# Patient Record
Sex: MALE | Race: WHITE | NOT HISPANIC OR LATINO | Employment: FULL TIME | URBAN - METROPOLITAN AREA
[De-identification: names, ages, dates, MRNs, and addresses within clinical notes are randomized per-mention and may not be internally consistent; named-entity substitution may affect disease eponyms.]

---

## 2017-05-04 ENCOUNTER — OFFICE VISIT (OUTPATIENT)
Dept: URGENT CARE | Facility: CLINIC | Age: 37
End: 2017-05-04
Payer: COMMERCIAL

## 2017-05-04 VITALS
HEART RATE: 70 BPM | TEMPERATURE: 97.9 F | DIASTOLIC BLOOD PRESSURE: 80 MMHG | SYSTOLIC BLOOD PRESSURE: 120 MMHG | RESPIRATION RATE: 16 BRPM | WEIGHT: 306 LBS | OXYGEN SATURATION: 96 % | BODY MASS INDEX: 37.26 KG/M2

## 2017-05-04 DIAGNOSIS — S39.012A STRAIN OF LUMBAR PARASPINAL MUSCLE, INITIAL ENCOUNTER: ICD-10-CM

## 2017-05-04 PROCEDURE — 99214 OFFICE O/P EST MOD 30 MIN: CPT | Performed by: PHYSICIAN ASSISTANT

## 2017-05-04 PROCEDURE — 99999 PR NO CHARGE: CPT | Performed by: PHYSICIAN ASSISTANT

## 2017-05-04 RX ORDER — CYCLOBENZAPRINE HCL 10 MG
10 TABLET ORAL 3 TIMES DAILY PRN
Qty: 30 TAB | Refills: 0 | Status: SHIPPED | OUTPATIENT
Start: 2017-05-04 | End: 2017-09-11

## 2017-05-04 RX ORDER — NAPROXEN 500 MG/1
500 TABLET ORAL 2 TIMES DAILY WITH MEALS
Qty: 60 TAB | Refills: 2 | Status: SHIPPED | OUTPATIENT
Start: 2017-05-04 | End: 2017-09-11

## 2017-05-04 RX ORDER — KETOROLAC TROMETHAMINE 30 MG/ML
60 INJECTION, SOLUTION INTRAMUSCULAR; INTRAVENOUS ONCE
Status: COMPLETED | OUTPATIENT
Start: 2017-05-04 | End: 2017-05-04

## 2017-05-04 RX ADMIN — KETOROLAC TROMETHAMINE 60 MG: 30 INJECTION, SOLUTION INTRAMUSCULAR; INTRAVENOUS at 16:36

## 2017-05-04 ASSESSMENT — ENCOUNTER SYMPTOMS
BACK PAIN: 1
TINGLING: 0
FEVER: 0
CHILLS: 0
SENSORY CHANGE: 0
VOMITING: 0
NAUSEA: 0
FOCAL WEAKNESS: 0
ABDOMINAL PAIN: 0

## 2017-05-04 NOTE — Clinical Note
May 4, 2017       Patient: Camacho Cardona   YOB: 1980   Date of Visit: 5/4/2017         To Whom It May Concern:    It is my medical opinion that Camacho Cardona should be excused from work for today and tomorrow due to illness.      If you have any questions or concerns, please don't hesitate to call [phone]          Sincerely,          Zach Turcios PA-C  Electronically Signed

## 2017-05-04 NOTE — PROGRESS NOTES
Subjective:      Camacho Cardona is a 36 y.o. male who presents with Back Pain            Back Pain  Pertinent negatives include no abdominal pain, fever or tingling.   notes long PMH of mult back inj, had seen spine MD around 10yrs ago and surg was recommended, ntoes was lying on floor playing w/ kids about one week ago, went to push himself up and felt pain limiting ROM, tried aleve over the week, tried ice/heat / stretching, pain on both sides R>L, radiation down bilat legs. Denies saddle anesthesia, incontinence of urine or bowel, retention of urine or bowel, also denies numbness/tingling or weakness to UE/LE.     Review of Systems   Constitutional: Negative for fever and chills.   Gastrointestinal: Negative for nausea, vomiting and abdominal pain.   Musculoskeletal: Positive for back pain.   Neurological: Negative for tingling, sensory change and focal weakness.     PMH:  has no past medical history on file.  MEDS:   Current outpatient prescriptions:   •  hydrocodone-acetaminophen (NORCO) 5-325 MG Tab per tablet, Take 1 Tab by mouth every four hours as needed., Disp: 18 Tab, Rfl: 0  •  ondansetron (ZOFRAN ODT) 4 MG TABLET DISPERSIBLE, Take 1 Tab by mouth every 8 hours as needed for Nausea/Vomiting., Disp: 10 Tab, Rfl: 0  •  clindamycin (CLEOCIN) 300 MG Cap, Take 300 mg by mouth 3 times a day., Disp: , Rfl:   •  diphenoxylate-atropine (LOMOTIL) 2.5-0.025 MG Tab, Take 2 Tabs by mouth 4 times a day as needed for Diarrhea., Disp: 24 Tab, Rfl: 1  •  sumatriptan (IMITREX) 100 MG tablet, Take 1 Tab by mouth Once PRN for Migraine for up to 1 dose. May repeat in 1 h as needed, Disp: 10 Tab, Rfl: 3  •  aspirin  MG TBEC, Take 1 Tab by mouth every day., Disp: 60 Tab, Rfl: 1  ALLERGIES:   Allergies   Allergen Reactions   • Morphine    • Pcn [Penicillins]    • Robaxin [Methocarbamol] Anaphylaxis     SURGHX: No past surgical history on file.  SOCHX:  reports that he has been smoking Cigars.  He does not have  any smokeless tobacco history on file. He reports that he drinks about 1.0 oz of alcohol per week. He reports that he does not use illicit drugs.  FH: Family history was reviewed, no pertinent findings to report    I have worn a mask for the entire encounter with this patient.        Objective:     /80 mmHg  Pulse 70  Temp(Src) 36.6 °C (97.9 °F)  Resp 16  Wt 138.801 kg (306 lb)  SpO2 96%     Physical Exam   Constitutional: He is oriented to person, place, and time. He appears well-developed and well-nourished. No distress.   HENT:   Head: Normocephalic and atraumatic.   Right Ear: External ear normal.   Left Ear: External ear normal.   Nose: Nose normal.   Eyes: Conjunctivae are normal. Right eye exhibits no discharge. Left eye exhibits no discharge. No scleral icterus.   Neck: Neck supple.   Pulmonary/Chest: Effort normal. No respiratory distress.   Musculoskeletal:        Lumbar back: He exhibits decreased range of motion ( 2/2 pain), tenderness ( primary paraspinous bilat), pain and spasm. He exhibits no bony tenderness, no swelling, no edema, no deformity, no laceration and normal pulse.        Back:    Neurological: He is alert and oriented to person, place, and time. He has normal strength and normal reflexes. He is not disoriented. No sensory deficit. He displays a negative Romberg sign. Coordination and gait normal.   SLR normal bilat   Skin: Skin is warm and dry. He is not diaphoretic. No pallor.   Psychiatric: He has a normal mood and affect.   Nursing note and vitals reviewed.     toradol - tolerates well    Assessment/Plan:     1. Strain of lumbar paraspinal muscle, initial encounter  Recommend conservative care, rest, ice/heat, work on gentle ROM exercises (sent w/ book)  Return to clinic with lack of resolution or progression of symptoms.  Cautioned regarding potential for sedation with medication.  F/u w/ spine, sent w/ work note    - cyclobenzaprine (FLEXERIL) 10 MG Tab; Take 1 Tab by  mouth 3 times a day as needed.  Dispense: 30 Tab; Refill: 0  - ketorolac (TORADOL) 60 mg injection; 2 mL by Intramuscular route Once.  - REFERRAL TO ORTHOPEDICS  - naproxen (NAPROSYN) 500 MG Tab; Take 1 Tab by mouth 2 times a day, with meals.  Dispense: 60 Tab; Refill: 2

## 2017-05-04 NOTE — MR AVS SNAPSHOT
Camacho Cardona   2017 3:45 PM   Office Visit   MRN: [id number]    Department:  [facility]   Dept Phone:  [phone]    Description:  Male : 1980   Provider:  Zach Turcios PA-C           Reason for Visit     Back Pain x 1 wk, bilateral lower back pain, pain is worse on the Rt. side, pain travel down on the back of both legs      Allergies as of 2017     Allergen Noted Reactions    Morphine 2012       Pcn [Penicillins] 2012       Robaxin [Methocarbamol] 2012   Anaphylaxis      You were diagnosed with     Strain of lumbar paraspinal muscle, initial encounter   [320751]         Vital Signs     Blood Pressure Pulse Temperature Respirations Weight Oxygen Saturation    120/80 mmHg 70 36.6 °C (97.9 °F) 16 138.801 kg (306 lb) 96%    Smoking Status                   Current Some Day Smoker           Basic Information     Date Of Birth Sex Race Ethnicity Preferred Language    1980 Male White Non- English      Problem List              ICD-10-CM Priority Class Noted - Resolved    Cephalalgia R51 Medium  2012 - Present    Cerebral infarction (CMS-[facility]) I63.9 High  2014 - Present    R Hemiparesthesia R20.2 High  7/10/2014 - Present    Dizziness R42 Medium  7/10/2014 - Present    History of concussion Z87.820 Medium  7/10/2014 - Present    Dehydration E86.0 Medium  7/10/2014 - Present    Epistaxis R04.0 Medium  7/10/2014 - Present      Health Maintenance        Date Due Completion Dates    IMM DTaP/Tdap/Td Vaccine (1 - Tdap) 2000 ---            Current Immunizations     No immunizations on file.      Below and/or attached are the medications your provider expects you to take. Review all of your home medications and newly ordered medications with your provider and/or pharmacist. Follow medication instructions as directed by your provider and/or pharmacist. Please keep your medication list with you and share with your provider. Update the  information when medications are discontinued, doses are changed, or new medications (including over-the-counter products) are added; and carry medication information at all times in the event of emergency situations     Allergies:  MORPHINE - (reactions not documented)     PCN - (reactions not documented)     ROBAXIN - Anaphylaxis               Medications  Valid as of: May 04, 2017 -  4:49 PM    Generic Name Brand Name Tablet Size Instructions for use    Aspirin (Tablet Delayed Response) Aspirin 325 MG Take 1 Tab by mouth every day.        Clindamycin HCl (Cap) CLEOCIN 300 MG Take 300 mg by mouth 3 times a day.        Cyclobenzaprine HCl (Tab) FLEXERIL 10 MG Take 1 Tab by mouth 3 times a day as needed.        Diphenoxylate-Atropine (Tab) LOMOTIL 2.5-0.025 MG Take 2 Tabs by mouth 4 times a day as needed for Diarrhea.        Hydrocodone-Acetaminophen (Tab) NORCO 5-325 MG Take 1 Tab by mouth every four hours as needed.        Naproxen (Tab) NAPROSYN 500 MG Take 1 Tab by mouth 2 times a day, with meals.        Ondansetron (TABLET DISPERSIBLE) ZOFRAN ODT 4 MG Take 1 Tab by mouth every 8 hours as needed for Nausea/Vomiting.        SUMAtriptan Succinate (Tab) IMITREX 100 MG Take 1 Tab by mouth Once PRN for Migraine for up to 1 dose. May repeat in 1 h as needed        .                 Medicines prescribed today were sent to:     [facility] [address] [place] [address] [place], [place] - [address] AT [facility] [employer] [address] [place] [address] [address] [place] [address]    Phone: [phone] Fax: [phone]    Open 24 Hours?: No      Medication refill instructions:       If your prescription bottle indicates you have medication refills left, it is not necessary to call your provider’s office. Please contact your pharmacy and they will refill your medication.    If your prescription bottle indicates you do not have any refills left, you may request refills at any time through one of the following ways: The online [employer] system (except  Urgent Care), by calling your provider’s office, or by asking your pharmacy to contact your provider’s office with a refill request. Medication refills are processed only during regular business hours and may not be available until the next business day. Your provider may request additional information or to have a follow-up visit with you prior to refilling your medication.   *Please Note: Medication refills are assigned a new Rx number when refilled electronically. Your pharmacy may indicate that no refills were authorized even though a new prescription for the same medication is available at the pharmacy. Please request the medicine by name with the pharmacy before contacting your provider for a refill.        Referral     A referral request has been sent to our patient care coordination department. Please allow 3-5 business days for us to process this request and contact you either by phone or mail. If you do not hear from us by the 5th business day, please call us at [phone].           [employer] Access Code: Activation code not generated  Current [employer] Status: Active          Quit Tobacco Information     Do you want to quit using tobacco?    Quitting tobacco decreases risks of cancer, heart and lung disease, increases life expectancy, improves sense of taste and smell, and increases spending money, among other benefits.    If you are thinking about quitting, we can help.  • [facility] Quit Tobacco Program: 932.630.9488  o Program occurs weekly for four weeks and includes pharmacist consultation on products to support quitting smoking or chewing tobacco. A provider referral is needed for pharmacist consultation.  • Tobacco Users Help Hotline: 6-315-QUIT-NOW (928-2098) or https://[place].quitlogix.org/  o Free, confidential telephone and online coaching for [place] residents. Sessions are designed on a schedule that is convenient for you. Eligible clients receive free nicotine replacement therapy.  • Nationally:  www.smokefree.gov  o Information and professional assistance to support both immediate and long-term needs as you become, and remain, a non-smoker. Smokefree.gov allows you to choose the help that best fits your needs.

## 2017-05-09 DIAGNOSIS — M62.830 LUMBAR PARASPINAL MUSCLE SPASM: ICD-10-CM

## 2017-05-09 RX ORDER — METHYLPREDNISOLONE 4 MG/1
TABLET ORAL
Qty: 1 KIT | Refills: 0 | Status: SHIPPED | OUTPATIENT
Start: 2017-05-09 | End: 2017-09-11

## 2017-06-02 ENCOUNTER — HOSPITAL ENCOUNTER (OUTPATIENT)
Dept: RADIOLOGY | Facility: MEDICAL CENTER | Age: 37
End: 2017-06-02
Attending: ORTHOPAEDIC SURGERY
Payer: COMMERCIAL

## 2017-06-02 DIAGNOSIS — M54.5 LOW BACK PAIN, UNSPECIFIED BACK PAIN LATERALITY, UNSPECIFIED CHRONICITY, WITH SCIATICA PRESENCE UNSPECIFIED: ICD-10-CM

## 2017-06-02 PROCEDURE — 72148 MRI LUMBAR SPINE W/O DYE: CPT

## 2017-06-20 ENCOUNTER — APPOINTMENT (OUTPATIENT)
Dept: PHYSICAL THERAPY | Facility: REHABILITATION | Age: 37
End: 2017-06-20
Attending: ORTHOPAEDIC SURGERY
Payer: COMMERCIAL

## 2017-06-22 ENCOUNTER — APPOINTMENT (OUTPATIENT)
Dept: PHYSICAL THERAPY | Facility: REHABILITATION | Age: 37
End: 2017-06-22
Attending: ORTHOPAEDIC SURGERY
Payer: COMMERCIAL

## 2017-06-27 ENCOUNTER — APPOINTMENT (OUTPATIENT)
Dept: PHYSICAL THERAPY | Facility: REHABILITATION | Age: 37
End: 2017-06-27
Attending: ORTHOPAEDIC SURGERY
Payer: COMMERCIAL

## 2017-06-29 ENCOUNTER — APPOINTMENT (OUTPATIENT)
Dept: PHYSICAL THERAPY | Facility: REHABILITATION | Age: 37
End: 2017-06-29
Attending: ORTHOPAEDIC SURGERY
Payer: COMMERCIAL

## 2017-07-05 ENCOUNTER — APPOINTMENT (OUTPATIENT)
Dept: PHYSICAL THERAPY | Facility: REHABILITATION | Age: 37
End: 2017-07-05
Attending: ORTHOPAEDIC SURGERY
Payer: COMMERCIAL

## 2017-07-07 ENCOUNTER — APPOINTMENT (OUTPATIENT)
Dept: PHYSICAL THERAPY | Facility: REHABILITATION | Age: 37
End: 2017-07-07
Attending: ORTHOPAEDIC SURGERY
Payer: COMMERCIAL

## 2017-07-11 ENCOUNTER — APPOINTMENT (OUTPATIENT)
Dept: PHYSICAL THERAPY | Facility: REHABILITATION | Age: 37
End: 2017-07-11
Attending: ORTHOPAEDIC SURGERY
Payer: COMMERCIAL

## 2017-07-13 ENCOUNTER — APPOINTMENT (OUTPATIENT)
Dept: PHYSICAL THERAPY | Facility: REHABILITATION | Age: 37
End: 2017-07-13
Attending: ORTHOPAEDIC SURGERY
Payer: COMMERCIAL

## 2017-09-11 ENCOUNTER — HOSPITAL ENCOUNTER (OUTPATIENT)
Dept: PAIN MANAGEMENT | Facility: REHABILITATION | Age: 37
End: 2017-09-11
Attending: PHYSICAL MEDICINE & REHABILITATION
Payer: COMMERCIAL

## 2017-09-11 ENCOUNTER — HOSPITAL ENCOUNTER (OUTPATIENT)
Dept: RADIOLOGY | Facility: REHABILITATION | Age: 37
End: 2017-09-11
Attending: PHYSICAL MEDICINE & REHABILITATION
Payer: COMMERCIAL

## 2017-09-11 VITALS
WEIGHT: 302.91 LBS | TEMPERATURE: 98.3 F | HEART RATE: 66 BPM | RESPIRATION RATE: 17 BRPM | BODY MASS INDEX: 36.89 KG/M2 | OXYGEN SATURATION: 94 % | SYSTOLIC BLOOD PRESSURE: 146 MMHG | HEIGHT: 76 IN | DIASTOLIC BLOOD PRESSURE: 93 MMHG

## 2017-09-11 PROCEDURE — 64484 NJX AA&/STRD TFRM EPI L/S EA: CPT

## 2017-09-11 PROCEDURE — 700101 HCHG RX REV CODE 250

## 2017-09-11 PROCEDURE — 64483 NJX AA&/STRD TFRM EPI L/S 1: CPT

## 2017-09-11 PROCEDURE — 700111 HCHG RX REV CODE 636 W/ 250 OVERRIDE (IP)

## 2017-09-11 PROCEDURE — 700117 HCHG RX CONTRAST REV CODE 255

## 2017-09-11 RX ORDER — LIDOCAINE HYDROCHLORIDE 20 MG/ML
INJECTION, SOLUTION EPIDURAL; INFILTRATION; INTRACAUDAL; PERINEURAL
Status: COMPLETED
Start: 2017-09-11 | End: 2017-09-11

## 2017-09-11 RX ORDER — METHYLPREDNISOLONE ACETATE 80 MG/ML
INJECTION, SUSPENSION INTRA-ARTICULAR; INTRALESIONAL; INTRAMUSCULAR; SOFT TISSUE
Status: COMPLETED
Start: 2017-09-11 | End: 2017-09-11

## 2017-09-11 RX ORDER — DEXAMETHASONE SODIUM PHOSPHATE 10 MG/ML
INJECTION, SOLUTION INTRAMUSCULAR; INTRAVENOUS
Status: COMPLETED
Start: 2017-09-11 | End: 2017-09-11

## 2017-09-11 RX ADMIN — LIDOCAINE HYDROCHLORIDE 4 ML: 20 INJECTION, SOLUTION EPIDURAL; INFILTRATION; INTRACAUDAL; PERINEURAL at 15:39

## 2017-09-11 RX ADMIN — METHYLPREDNISOLONE ACETATE 80 MG: 80 INJECTION, SUSPENSION INTRA-ARTICULAR; INTRALESIONAL; INTRAMUSCULAR; SOFT TISSUE at 15:39

## 2017-09-11 RX ADMIN — IOHEXOL 4 ML: 240 INJECTION, SOLUTION INTRATHECAL; INTRAVASCULAR; INTRAVENOUS; ORAL at 15:38

## 2017-09-11 ASSESSMENT — PAIN SCALES - GENERAL
PAINLEVEL_OUTOF10: 6
PAINLEVEL_OUTOF10: 5

## 2017-09-11 NOTE — PROGRESS NOTES
Current medications reviewed with pt, see medications reconciliation form. Pt wiliam taking ASA or other blood thinners or anti-inflammatories.  Pt has a ride post-procedure(Fiance to drive).  Printed and verbal discharge instructions given to pt who verbalized understanding.

## 2017-09-12 NOTE — PROCEDURES
DATE OF SERVICE:  09/11/2017    PREOPERATIVE DIAGNOSIS:  Lumbar degenerative disk disease and radiculopathy.    POSTOPERATIVE DIAGNOSIS:  Lumbar degenerative disk disease and radiculopathy.    OPERATION PERFORMED:  1.  Bilateral L5 selective nerve root blocks.  2.  Epidurogram.  3.  Interpretation of epidurogram.    SURGEON:  Dong Ray Jr., MD    ANESTHESIA:  None.    ESTIMATED BLOOD LOSS:  None.    IV FLUIDS:  None.    COMPLICATIONS:  None.    Dear Dr. Thakkar,    Thank you for the referral of this patient.    SUBJECTIVE:  The patient reports constant, achy low back pain and bilateral   lower extremity radicular pain that travels down his posterior thighs and legs   to his heels.    OBJECTIVE:  VITAL SIGNS:  Pulse is 66, respirations 16, and blood pressure 134/88.  GENERAL:  The patient is alert and oriented x3 in no acute distress.  CARDIOVASCULAR:  Shows no ankle edema bilaterally.  SKIN:  He has normal temperature and good capillary refill.  MUSCULOSKELETAL:  He has tenderness to palpation over his bilateral lumbar   paraspinal muscles.    INDICATIONS:  This is a 37-year-old male patient with chronic low back pain   who is here today for bilateral L5 selective nerve root blocks.  He was given   and explained risks, benefits and alternatives of the procedure, including   bleeding, infection, nerve damage, increased pain, paralysis or weakness,   which may be permanent, coma, stroke and even death.  The patient understood   all risks and consented to the procedure today.  He has been n.p.o. and has a   ride home.    PROCEDURE IN DETAIL:  After discussion of the risks and benefits, the patient   consented to the procedure and was taken to the fluoroscopy suite.  He was   placed in the prone position on the fluoroscopy table, and monitors were   applied.  The lumbosacral area was cleaned and prepped in the usual sterile   fashion with Betadine x3 and a sterile drape.  Using AP fluoroscopy, the L5   vertebral  "level was brought into view and bilateral, \"Vazquez dog\" views were   optimized.  The skin overlying the target at the neck, at the Vazquez dog was   anesthetized with 1% lidocaine using a 27-gauge needle.  Next, using two,   22-gauge, 5-inch spinal needles, they were directed to each of the appropriate   locations at the neck of the Vazquez isaacs without difficulty or problems.    Multiple fluoroscopic views were obtained to confirm needle placement.    Contrast dye was then instilled which showed an excellent neurogram of the   bilateral L5 nerve roots with both anterior and posterior spread into the   epidural space.  No filling defects were seen.  There was no evidence of   vascular pattern.  A mixture containing 80 mg Depo-Medrol and 4 mL of 2%   Lidocaine was then distributed equally between the 2 transforaminal locations   and the needles were flushed and withdrawn.  The patient's back was cleaned   and he was taken to the postop area where he was monitored for 30-45 minutes   without complications and with stable vital signs.  He was given appropriate   discharge instructions and was discharged home with a responsible adult.       ____________________________________     MD YAMILETH Palmer Jr. / LUCINDA    DD:  09/11/2017 17:35:15  DT:  09/11/2017 17:52:30    D#:  [id number]  Job#:  [id number]  "

## 2018-01-06 ENCOUNTER — HOSPITAL ENCOUNTER (OUTPATIENT)
Facility: MEDICAL CENTER | Age: 38
End: 2018-01-06
Attending: PHYSICIAN ASSISTANT
Payer: COMMERCIAL

## 2018-01-06 ENCOUNTER — OFFICE VISIT (OUTPATIENT)
Dept: URGENT CARE | Facility: PHYSICIAN GROUP | Age: 38
End: 2018-01-06
Payer: COMMERCIAL

## 2018-01-06 VITALS
DIASTOLIC BLOOD PRESSURE: 86 MMHG | TEMPERATURE: 99 F | OXYGEN SATURATION: 96 % | HEIGHT: 76 IN | WEIGHT: 303 LBS | HEART RATE: 87 BPM | BODY MASS INDEX: 36.9 KG/M2 | SYSTOLIC BLOOD PRESSURE: 122 MMHG

## 2018-01-06 DIAGNOSIS — R19.7 DIARRHEA OF PRESUMED INFECTIOUS ORIGIN: ICD-10-CM

## 2018-01-06 DIAGNOSIS — R10.13 EPIGASTRIC PAIN: ICD-10-CM

## 2018-01-06 DIAGNOSIS — R19.7 DIARRHEA OF PRESUMED INFECTIOUS ORIGIN: Primary | ICD-10-CM

## 2018-01-06 LAB
ALBUMIN SERPL BCP-MCNC: 4.4 G/DL (ref 3.2–4.9)
ALBUMIN/GLOB SERPL: 1.3 G/DL
ALP SERPL-CCNC: 79 U/L (ref 30–99)
ALT SERPL-CCNC: 24 U/L (ref 2–50)
ANION GAP SERPL CALC-SCNC: 9 MMOL/L (ref 0–11.9)
APPEARANCE UR: CLEAR
AST SERPL-CCNC: 20 U/L (ref 12–45)
BASOPHILS # BLD AUTO: 0.2 % (ref 0–1.8)
BASOPHILS # BLD: 0.01 K/UL (ref 0–0.12)
BILIRUB SERPL-MCNC: 1.1 MG/DL (ref 0.1–1.5)
BILIRUB UR STRIP-MCNC: NEGATIVE MG/DL
BUN SERPL-MCNC: 18 MG/DL (ref 8–22)
CALCIUM SERPL-MCNC: 9.3 MG/DL (ref 8.5–10.5)
CHLORIDE SERPL-SCNC: 109 MMOL/L (ref 96–112)
CO2 SERPL-SCNC: 21 MMOL/L (ref 20–33)
COLOR UR AUTO: NORMAL
CREAT SERPL-MCNC: 0.92 MG/DL (ref 0.5–1.4)
EOSINOPHIL # BLD AUTO: 0.08 K/UL (ref 0–0.51)
EOSINOPHIL NFR BLD: 1.2 % (ref 0–6.9)
ERYTHROCYTE [DISTWIDTH] IN BLOOD BY AUTOMATED COUNT: 40.2 FL (ref 35.9–50)
EST. AVERAGE GLUCOSE BLD GHB EST-MCNC: 103 MG/DL
G LAMBLIA+C PARVUM AG STL QL RAPID: NORMAL
GFR SERPL CREATININE-BSD FRML MDRD: >60 ML/MIN/1.73 M 2
GLOBULIN SER CALC-MCNC: 3.3 G/DL (ref 1.9–3.5)
GLUCOSE SERPL-MCNC: 95 MG/DL (ref 65–99)
GLUCOSE UR STRIP.AUTO-MCNC: NEGATIVE MG/DL
HBA1C MFR BLD: 5.2 % (ref 0–5.6)
HCT VFR BLD AUTO: 45.5 % (ref 42–52)
HGB BLD-MCNC: 16 G/DL (ref 14–18)
IMM GRANULOCYTES # BLD AUTO: 0.02 K/UL (ref 0–0.11)
IMM GRANULOCYTES NFR BLD AUTO: 0.3 % (ref 0–0.9)
KETONES UR STRIP.AUTO-MCNC: NEGATIVE MG/DL
LEUKOCYTE ESTERASE UR QL STRIP.AUTO: NEGATIVE
LIPASE SERPL-CCNC: 23 U/L (ref 11–82)
LYMPHOCYTES # BLD AUTO: 1.7 K/UL (ref 1–4.8)
LYMPHOCYTES NFR BLD: 25.6 % (ref 22–41)
MCH RBC QN AUTO: 31.4 PG (ref 27–33)
MCHC RBC AUTO-ENTMCNC: 35.2 G/DL (ref 33.7–35.3)
MCV RBC AUTO: 89.4 FL (ref 81.4–97.8)
MONOCYTES # BLD AUTO: 0.7 K/UL (ref 0–0.85)
MONOCYTES NFR BLD AUTO: 10.5 % (ref 0–13.4)
NEUTROPHILS # BLD AUTO: 4.13 K/UL (ref 1.82–7.42)
NEUTROPHILS NFR BLD: 62.2 % (ref 44–72)
NITRITE UR QL STRIP.AUTO: NEGATIVE
NRBC # BLD AUTO: 0 K/UL
NRBC BLD-RTO: 0 /100 WBC
PH UR STRIP.AUTO: 6 [PH] (ref 5–8)
PLATELET # BLD AUTO: 167 K/UL (ref 164–446)
PMV BLD AUTO: 10 FL (ref 9–12.9)
POTASSIUM SERPL-SCNC: 4 MMOL/L (ref 3.6–5.5)
PROT SERPL-MCNC: 7.7 G/DL (ref 6–8.2)
PROT UR QL STRIP: NORMAL MG/DL
RBC # BLD AUTO: 5.09 M/UL (ref 4.7–6.1)
RBC UR QL AUTO: NEGATIVE
SIGNIFICANT IND 70042: NORMAL
SITE SITE: NORMAL
SODIUM SERPL-SCNC: 139 MMOL/L (ref 135–145)
SOURCE SOURCE: NORMAL
SP GR UR STRIP.AUTO: 1.02
UROBILINOGEN UR STRIP-MCNC: NEGATIVE MG/DL
WBC # BLD AUTO: 6.6 K/UL (ref 4.8–10.8)

## 2018-01-06 PROCEDURE — 83690 ASSAY OF LIPASE: CPT

## 2018-01-06 PROCEDURE — 83036 HEMOGLOBIN GLYCOSYLATED A1C: CPT

## 2018-01-06 PROCEDURE — 87328 CRYPTOSPORIDIUM AG IA: CPT

## 2018-01-06 PROCEDURE — 81002 URINALYSIS NONAUTO W/O SCOPE: CPT | Performed by: PHYSICIAN ASSISTANT

## 2018-01-06 PROCEDURE — 87329 GIARDIA AG IA: CPT

## 2018-01-06 PROCEDURE — 85025 COMPLETE CBC W/AUTO DIFF WBC: CPT

## 2018-01-06 PROCEDURE — 80053 COMPREHEN METABOLIC PANEL: CPT

## 2018-01-06 PROCEDURE — 99214 OFFICE O/P EST MOD 30 MIN: CPT | Performed by: PHYSICIAN ASSISTANT

## 2018-01-06 RX ORDER — DICYCLOMINE HYDROCHLORIDE 10 MG/1
10 CAPSULE ORAL
Qty: 40 CAP | Refills: 0 | Status: SHIPPED | OUTPATIENT
Start: 2018-01-06 | End: 2018-01-16

## 2018-01-06 RX ORDER — METRONIDAZOLE 500 MG/1
500 TABLET ORAL 3 TIMES DAILY
Qty: 21 TAB | Refills: 0 | Status: SHIPPED | OUTPATIENT
Start: 2018-01-06 | End: 2018-01-13

## 2018-01-06 ASSESSMENT — ENCOUNTER SYMPTOMS
PSYCHIATRIC NEGATIVE: 1
MUSCULOSKELETAL NEGATIVE: 1
DIARRHEA: 1
NAUSEA: 1
ABDOMINAL PAIN: 1
CARDIOVASCULAR NEGATIVE: 1
DIZZINESS: 1
HEADACHES: 1
RESPIRATORY NEGATIVE: 1
EYES NEGATIVE: 1
BLOOD IN STOOL: 0

## 2018-01-06 NOTE — PATIENT INSTRUCTIONS
Try a bowel reset diet:  Clear liquids for 24 hours  Full liquids for next 24 hrs  BRAT diet after that for 2-3 days  B Banana  R Rice  A Applesauce  T Toast

## 2018-01-06 NOTE — PROGRESS NOTES
Subjective:      Camacho Cardona is a 37 y.o. male who presents with Diarrhea (x1 wk )            HPI  Chief Complaint   Patient presents with   • Diarrhea     x1 wk        HPI:  Camacho Cardona is a 37 y.o. male who presents with diarrhea x 1 week.  Having diarrhea every 15 minutes worsening last night. Past week almost 2 weeks trying imodium and pepto.  Had some epigastric pain. Imodium helped for a while.  Whole family sick as well.   No antibiotics recently.  Did try aleve and made it worse.  Stool color is dark brown to lighter in color.  Super watery.  Different smell.    Not making as much urine.    Dizzy with standing. Decreased energy.    Worse with laying on his left side.    No recent camping trips.    No recent travel.    No past medical history on file.    No past surgical history on file.    Family History   Problem Relation Age of Onset   • Hypertension Mother    • Lung Disease Mother    • Diabetes Mother    • Cancer Mother      No pertinent family history.    Social History     Social History   • Marital status: Single     Spouse name: N/A   • Number of children: N/A   • Years of education: N/A     Occupational History   • Not on file.     Social History Main Topics   • Smoking status: Current Some Day Smoker     Types: Cigars   • Smokeless tobacco: Not on file   • Alcohol use 1.0 oz/week     2 Cans of beer per week      Comment: occasional, use tonight   • Drug use: No   • Sexual activity: Yes     Partners: Female     Other Topics Concern   • Not on file     Social History Narrative    ** Merged History Encounter **            No current outpatient prescriptions on file.    Allergies   Allergen Reactions   • Morphine    • Pcn [Penicillins]    • Robaxin [Methocarbamol] Anaphylaxis        Review of Systems   Constitutional: Positive for malaise/fatigue.   HENT: Negative.    Eyes: Negative.    Respiratory: Negative.    Cardiovascular: Negative.    Gastrointestinal: Positive for  "abdominal pain, diarrhea, melena and nausea. Negative for blood in stool.   Genitourinary: Negative.    Musculoskeletal: Negative.    Skin: Negative.    Neurological: Positive for dizziness and headaches.   Endo/Heme/Allergies: Negative.    Psychiatric/Behavioral: Negative.           Objective:     /86   Pulse 87   Temp 37.2 °C (99 °F)   Ht 1.93 m (6' 4\")   Wt (!) 137.4 kg (303 lb)   SpO2 96%   BMI 36.88 kg/m²      Physical Exam       Nursing note and vitals reviewed.    Constitutional:  Appropriately groomed, pleasant affect, and in no acute distress.    Head: normocephalic and atraumatic.    Eye:   PERRLA, EOM's full, sclera white, no scleral icterus, conjunctiva not erythematous, and medial canthus without exudate bilaterally.    Ears:  Hearing grossly intact to voice.    Throat:  Oropharynx not erythematous, with no enlargement of the palatine tonsils bilaterally with no exudates.    Posterior oropharynx with no post nasal drainage present.  Soft palate rises symmetrically bilaterally and uvula midline.  Neck supple, with mild proximal anterior cervical chain lymphadenopathy that is soft and mobile to palpation.  Thyroid non-palpable.  No supraclavicular lymphadenopathy.    Lungs:  Lungs with normal respiratory excursion and effort.    Heart:  RRR, without murmurs, rubs, or gallops.  Carotid arteries without bruits bilaterally.  Radial and dorsalis pedis pulses 2+ bilaterally    Abdomen:  Protuberant.with striations Present. No ecchymosis, or lesions.  Bowel sounds present all 4Qs, hyperactive.  Normotympanic to percussion all 4Qs.  Epigastric  TTP.  No masses to palpation.  No hepatosplenomegaly, no TTP at McBurney's point, negative Johnson's sign, no rebound tenderness, and no guarding.  No CVA tenderness bilaterally.    MSK:  Gait and station wnl, non antalgic.    Derm:  No rashes or lesions with good turgor pressure.     Psychiatric:  Normal judgement, mood and affect.      Component Value Ref " Range & Units Status   Sodium 139 135 - 145 mmol/L Final   Potassium 4.0 3.6 - 5.5 mmol/L Final   Chloride 109 96 - 112 mmol/L Final   Co2 21 20 - 33 mmol/L Final   Anion Gap 9.0 0.0 - 11.9 Final   Glucose 95 65 - 99 mg/dL Final   Bun 18 8 - 22 mg/dL Final   Creatinine 0.92 0.50 - 1.40 mg/dL Final   Calcium 9.3 8.5 - 10.5 mg/dL Final   AST(SGOT) 20 12 - 45 U/L Final   ALT(SGPT) 24 2 - 50 U/L Final   Alkaline Phosphatase 79 30 - 99 U/L Final   Total Bilirubin 1.1 0.1 - 1.5 mg/dL Final   Albumin 4.4 3.2 - 4.9 g/dL Final   Total Protein 7.7 6.0 - 8.2 g/dL Final   Globulin 3.3 1.9 - 3.5 g/dL Final   A-G Ratio 1.3 g/dL Final     Component Value Ref Range & Units Status   GFR If African American >60 >60 mL/min/1.73 m 2 Final   GFR If Non African American >60 >60 mL/min/1.73 m 2 Final     Component Value Ref Range & Units Status   WBC 6.6 4.8 - 10.8 K/uL Final   RBC 5.09 4.70 - 6.10 M/uL Final   Hemoglobin 16.0 14.0 - 18.0 g/dL Final   Hematocrit 45.5 42.0 - 52.0 % Final   MCV 89.4 81.4 - 97.8 fL Final   MCH 31.4 27.0 - 33.0 pg Final   MCHC 35.2 33.7 - 35.3 g/dL Final   RDW 40.2 35.9 - 50.0 fL Final   Platelet Count 167 164 - 446 K/uL Final   MPV 10.0 9.0 - 12.9 fL Final   Neutrophils-Polys 62.20 44.00 - 72.00 % Final   Lymphocytes 25.60 22.00 - 41.00 % Final   Monocytes 10.50 0.00 - 13.40 % Final   Eosinophils 1.20 0.00 - 6.90 % Final   Basophils 0.20 0.00 - 1.80 % Final   Immature Granulocytes 0.30 0.00 - 0.90 % Final   Nucleated RBC 0.00 /100 WBC Final   Neutrophils (Absolute) 4.13 1.82 - 7.42 K/uL Final   Comment:   Includes immature neutrophils, if present.   Lymphs (Absolute) 1.70 1.00 - 4.80 K/uL Final   Monos (Absolute) 0.70 0.00 - 0.85 K/uL Final   Eos (Absolute) 0.08 0.00 - 0.51 K/uL Final   Baso (Absolute) 0.01 0.00 - 0.12 K/uL Final   Immature Granulocytes (abs) 0.02 0.00 - 0.11 K/uL Final   NRBC (Absolute) 0.00 K/uL Final     Component Value Ref Range & Units Status   Lipase 23 11 - 82 U/L Final      Glycohemoglobin 5.2 0.0 - 5.6 % Final       Assessment/Plan:     1. Diarrhea of presumed infectious origin  CDIFF BY PCR    STOOL CULT+O+P+WBC    POCT Urinalysis    dicyclomine (BENTYL) 10 MG Cap    POCT Hemoglobin A1C    CBC WITH DIFFERENTIAL    COMP METABOLIC PANEL    LIPASE    metronidazole (FLAGYL) 500 MG Tab    REFERRAL TO GASTROENTEROLOGY    CANCELED: CBC WITH DIFFERENTIAL    CANCELED: COMP METABOLIC PANEL    CANCELED: LIPASE   2. Epigastric pain  POCT Urinalysis    dicyclomine (BENTYL) 10 MG Cap    POCT Hemoglobin A1C    CBC WITH DIFFERENTIAL    COMP METABOLIC PANEL    LIPASE    metronidazole (FLAGYL) 500 MG Tab    REFERRAL TO GASTROENTEROLOGY    CANCELED: LIPASE      Patient presents with 2 weeks of diarrhea 10+ times daily. Having generalized abdominal cramping. Has tried Imodium which helps for a short time and Pepto. Has had darker stool but no bright red blood per rectum or melena. Patient with no recent antibiotic use or recent hospitalization. No known history of C. difficile. Has had diarrhea in the past and was worked up and never got stool sample completed. On exam today patient is afebrile and comfortable appearing. Presents with wife. Low-grade temperature. Generalized abdominal tenderness with no guarding. Bowel sounds hyperactive. Plan to rule out C. difficile and other infectious disease. Cannot rule out colitis. Plan treatment with Bentyl and metronidazole. Obtain CBC, CMP and lipase to rule out epigastric causes. Referred to GI.Labwork available at closing negative for elevated pancreatic enzymes, liver enzymes, leukocytosis. Crypto spurred him and Giardia negative. PCR C. difficile still not back at this time.    Contacted and left message (patient self identified) at 6 PM.    Patient was in agreement with this treatment plan and seemed to understand without barriers. All questions were encouraged and answered.  Reviewed signs and symptoms of when to seek emergency medical care.      Please note that this dictation was created using voice recognition software.  I have made every reasonable attempt to correct obvious errors, but I expect there are errors of sheng and possibly content that I did not discover before finalizing the note.

## 2018-01-11 ENCOUNTER — TELEPHONE (OUTPATIENT)
Dept: URGENT CARE | Facility: PHYSICIAN GROUP | Age: 38
End: 2018-01-11

## 2018-01-12 NOTE — TELEPHONE ENCOUNTER
PT notified of missing labs.  PT states he's feeling a lot better and understands to continue the medication plan.

## 2018-01-12 NOTE — TELEPHONE ENCOUNTER
----- Message from Ethan Galo P.A.-C. sent at 1/11/2018  5:23 PM PST -----  Regarding: RE: Labs  Please contact patient and see if he is improving.  If not please get labs done.  If improving then no follow-up necessary.    Thank you Ethan Yo   ----- Message -----  From: Srinath Rogers, Med Ass't  Sent: 1/11/2018   3:10 PM  To: Ethan Galo P.A.-C.  Subject: Labs                                             Good afternoon Sunny,    I'm going over our lab reports and his CDIFF and STOOL CULT were missing.  I called the lab, and they stated that they missed those labs, and the patient will need to provide a new sample.  Please advise.

## 2018-09-22 ENCOUNTER — HOSPITAL ENCOUNTER (EMERGENCY)
Facility: MEDICAL CENTER | Age: 38
End: 2018-09-22
Attending: EMERGENCY MEDICINE
Payer: COMMERCIAL

## 2018-09-22 ENCOUNTER — APPOINTMENT (OUTPATIENT)
Dept: RADIOLOGY | Facility: MEDICAL CENTER | Age: 38
End: 2018-09-22
Attending: EMERGENCY MEDICINE
Payer: COMMERCIAL

## 2018-09-22 VITALS
HEART RATE: 72 BPM | TEMPERATURE: 100.2 F | WEIGHT: 300 LBS | SYSTOLIC BLOOD PRESSURE: 122 MMHG | HEIGHT: 76 IN | RESPIRATION RATE: 16 BRPM | DIASTOLIC BLOOD PRESSURE: 67 MMHG | OXYGEN SATURATION: 92 % | BODY MASS INDEX: 36.53 KG/M2

## 2018-09-22 DIAGNOSIS — S01.81XA FACIAL LACERATION, INITIAL ENCOUNTER: ICD-10-CM

## 2018-09-22 DIAGNOSIS — S06.0X1A CONCUSSION WITH LOSS OF CONSCIOUSNESS OF 30 MINUTES OR LESS, INITIAL ENCOUNTER: ICD-10-CM
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
## 2018-09-22 DIAGNOSIS — T14.8XXA ABRASION: ICD-10-CM

## 2018-09-22 DIAGNOSIS — M54.2 ACUTE NECK PAIN: ICD-10-CM

## 2018-09-22 DIAGNOSIS — F10.920 ACUTE ALCOHOLIC INTOXICATION WITHOUT COMPLICATION (HCC): ICD-10-CM

## 2018-09-22 LAB
ANION GAP SERPL CALC-SCNC: 11 MMOL/L (ref 0–11.9)
APTT PPP: 25.6 SEC (ref 24.7–36)
BASOPHILS # BLD AUTO: 0.4 % (ref 0–1.8)
BASOPHILS # BLD: 0.02 K/UL (ref 0–0.12)
BUN SERPL-MCNC: 12 MG/DL (ref 8–22)
CALCIUM SERPL-MCNC: 9 MG/DL (ref 8.5–10.5)
CHLORIDE SERPL-SCNC: 111 MMOL/L (ref 96–112)
CO2 SERPL-SCNC: 21 MMOL/L (ref 20–33)
CREAT SERPL-MCNC: 0.93 MG/DL (ref 0.5–1.4)
EOSINOPHIL # BLD AUTO: 0.07 K/UL (ref 0–0.51)
EOSINOPHIL NFR BLD: 1.4 % (ref 0–6.9)
ERYTHROCYTE [DISTWIDTH] IN BLOOD BY AUTOMATED COUNT: 40.5 FL (ref 35.9–50)
ETHANOL BLD-MCNC: 0.12 G/DL
GLUCOSE SERPL-MCNC: 102 MG/DL (ref 65–99)
HCT VFR BLD AUTO: 41.6 % (ref 42–52)
HGB BLD-MCNC: 14.8 G/DL (ref 14–18)
IMM GRANULOCYTES # BLD AUTO: 0.02 K/UL (ref 0–0.11)
IMM GRANULOCYTES NFR BLD AUTO: 0.4 % (ref 0–0.9)
INR PPP: 1.12 (ref 0.87–1.13)
LYMPHOCYTES # BLD AUTO: 1.64 K/UL (ref 1–4.8)
LYMPHOCYTES NFR BLD: 33.7 % (ref 22–41)
MCH RBC QN AUTO: 31.8 PG (ref 27–33)
MCHC RBC AUTO-ENTMCNC: 35.6 G/DL (ref 33.7–35.3)
MCV RBC AUTO: 89.3 FL (ref 81.4–97.8)
MONOCYTES # BLD AUTO: 0.32 K/UL (ref 0–0.85)
MONOCYTES NFR BLD AUTO: 6.6 % (ref 0–13.4)
NEUTROPHILS # BLD AUTO: 2.8 K/UL (ref 1.82–7.42)
NEUTROPHILS NFR BLD: 57.5 % (ref 44–72)
NRBC # BLD AUTO: 0 K/UL
NRBC BLD-RTO: 0 /100 WBC
PLATELET # BLD AUTO: 161 K/UL (ref 164–446)
PMV BLD AUTO: 9.8 FL (ref 9–12.9)
POTASSIUM SERPL-SCNC: 3.7 MMOL/L (ref 3.6–5.5)
PROTHROMBIN TIME: 14.5 SEC (ref 12–14.6)
RBC # BLD AUTO: 4.66 M/UL (ref 4.7–6.1)
SODIUM SERPL-SCNC: 143 MMOL/L (ref 135–145)
WBC # BLD AUTO: 4.9 K/UL (ref 4.8–10.8)

## 2018-09-22 PROCEDURE — 700102 HCHG RX REV CODE 250 W/ 637 OVERRIDE(OP): Performed by: EMERGENCY MEDICINE

## 2018-09-22 PROCEDURE — 80307 DRUG TEST PRSMV CHEM ANLYZR: CPT

## 2018-09-22 PROCEDURE — 99284 EMERGENCY DEPT VISIT MOD MDM: CPT

## 2018-09-22 PROCEDURE — A9270 NON-COVERED ITEM OR SERVICE: HCPCS | Performed by: EMERGENCY MEDICINE

## 2018-09-22 PROCEDURE — 36415 COLL VENOUS BLD VENIPUNCTURE: CPT

## 2018-09-22 PROCEDURE — 70450 CT HEAD/BRAIN W/O DYE: CPT

## 2018-09-22 PROCEDURE — 85610 PROTHROMBIN TIME: CPT

## 2018-09-22 PROCEDURE — 72125 CT NECK SPINE W/O DYE: CPT

## 2018-09-22 PROCEDURE — 85730 THROMBOPLASTIN TIME PARTIAL: CPT

## 2018-09-22 PROCEDURE — 80048 BASIC METABOLIC PNL TOTAL CA: CPT

## 2018-09-22 PROCEDURE — 303353 HCHG DERMABOND SKIN ADHESIVE

## 2018-09-22 PROCEDURE — 304999 HCHG REPAIR-SIMPLE/INTERMED LEVEL 1

## 2018-09-22 PROCEDURE — 85025 COMPLETE CBC W/AUTO DIFF WBC: CPT

## 2018-09-22 RX ORDER — IBUPROFEN 600 MG/1
600 TABLET ORAL ONCE
Status: COMPLETED | OUTPATIENT
Start: 2018-09-22 | End: 2018-09-22

## 2018-09-22 RX ORDER — ACETAMINOPHEN 500 MG
1000 TABLET ORAL ONCE
Status: COMPLETED | OUTPATIENT
Start: 2018-09-22 | End: 2018-09-22

## 2018-09-22 RX ADMIN — IBUPROFEN 600 MG: 600 TABLET, FILM COATED ORAL at 04:51

## 2018-09-22 RX ADMIN — ACETAMINOPHEN 1000 MG: 500 TABLET, FILM COATED ORAL at 04:51

## 2018-09-22 ASSESSMENT — PAIN SCALES - GENERAL: PAINLEVEL_OUTOF10: 8
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
## 2018-09-22 NOTE — ED NOTES
All lines and monitors discontinued. Discharge instructions given, questions answered.    Ambulated to wife's room. Pt instructed to notify staff if he starts having any concerns or issues.  Rx x 0 given.

## 2018-09-25 ENCOUNTER — HOSPITAL ENCOUNTER (OUTPATIENT)
Facility: MEDICAL CENTER | Age: 38
End: 2018-09-25
Payer: COMMERCIAL

## 2018-09-25 LAB
BDY FAT % MEASURED: 35.4 %
BP DIAS: 80 MMHG
BP SYS: 138 MMHG
DIABETES HTDIA: NO
EVENT NAME HTEVT: NORMAL
HYPERTENSION HTHYP: NO
SCREENING LOC CITY HTCIT: NORMAL
SCREENING LOC STATE HTSTA: NORMAL
SCREENING LOCATION HTLOC: NORMAL
SUBSCRIBER ID HTSID: NORMAL

## 2018-09-26 LAB
CHOLEST SERPL-MCNC: 164 MG/DL (ref 100–199)
GLUCOSE SERPL-MCNC: 98 MG/DL (ref 65–99)
HDLC SERPL-MCNC: 35 MG/DL
LDLC SERPL CALC-MCNC: 90 MG/DL
TRIGL SERPL-MCNC: 194 MG/DL (ref 0–149)

## 2019-07-18 ENCOUNTER — OFFICE VISIT (OUTPATIENT)
Dept: URGENT CARE | Facility: PHYSICIAN GROUP | Age: 39
End: 2019-07-18
Payer: COMMERCIAL

## 2019-07-18 VITALS
TEMPERATURE: 98.6 F | WEIGHT: 310 LBS | SYSTOLIC BLOOD PRESSURE: 130 MMHG | RESPIRATION RATE: 16 BRPM | BODY MASS INDEX: 37.75 KG/M2 | HEART RATE: 74 BPM | HEIGHT: 76 IN | OXYGEN SATURATION: 96 % | DIASTOLIC BLOOD PRESSURE: 76 MMHG

## 2019-07-18 DIAGNOSIS — T63.441A BEE STING, ACCIDENTAL OR UNINTENTIONAL, INITIAL ENCOUNTER: ICD-10-CM

## 2019-07-18 PROCEDURE — 99214 OFFICE O/P EST MOD 30 MIN: CPT | Performed by: PHYSICIAN ASSISTANT

## 2019-07-18 RX ORDER — TRIAMCINOLONE ACETONIDE 1 MG/G
CREAM TOPICAL
Qty: 1 TUBE | Refills: 0 | Status: SHIPPED | OUTPATIENT
Start: 2019-07-18 | End: 2021-02-12

## 2019-07-18 RX ORDER — METHYLPREDNISOLONE 4 MG/1
TABLET ORAL
Qty: 1 KIT | Refills: 0 | Status: SHIPPED | OUTPATIENT
Start: 2019-07-18 | End: 2021-02-12

## 2019-07-18 ASSESSMENT — ENCOUNTER SYMPTOMS
FEVER: 0
DIARRHEA: 0
CHILLS: 0
VOMITING: 0
WHEEZING: 0
SHORTNESS OF BREATH: 0
SPUTUM PRODUCTION: 0
SORE THROAT: 0
STRIDOR: 0
ABDOMINAL PAIN: 0
COUGH: 0
MYALGIAS: 1
NAUSEA: 0

## 2019-07-18 NOTE — PROGRESS NOTES
"Subjective:   Camacho Cardona is a 39 y.o. male who presents for Bee Sting (on ear,and on arm )         Other    This is a new problem.  The current episode started in the past 7 days. Associated symptoms include myalgias. Pertinent negatives include no abdominal pain, chills, congestion, coughing, fever, nausea, rash, sore throat or vomiting.     Patient comes to clinic 2 to 3 days status post bee stings noted to the left arm and 1 to left ear.  He notes mild progression of redness and swelling associated with areas of staying.  Denies any compromising airway.  Denies swelling to throat, denies stridor, denies wheeze, denies new onset cough.  Complains of some pain to the sites of bee sting.  Denies past medical history of bee sting allergies.  Denies purulent discharge or drainage.  Notes some mild localized swelling.    Review of Systems   Constitutional: Negative for chills and fever.   HENT: Negative for congestion and sore throat.    Respiratory: Negative for cough, sputum production, shortness of breath, wheezing and stridor.    Gastrointestinal: Negative for abdominal pain, diarrhea, nausea and vomiting.   Musculoskeletal: Positive for myalgias.   Skin: Negative for rash.        Positive for bee stings     Allergies   Allergen Reactions   • Morphine    • Pcn [Penicillins]    • Robaxin [Methocarbamol] Anaphylaxis      Objective:   /76   Pulse 74   Temp 37 °C (98.6 °F)   Resp 16   Ht 1.93 m (6' 4\")   Wt (!) 140.6 kg (310 lb)   SpO2 96%   BMI 37.73 kg/m²    Physical Exam   Constitutional: He is oriented to person, place, and time. He appears well-developed and well-nourished. No distress.   HENT:   Head: Normocephalic and atraumatic.   Right Ear: External ear normal.   Left Ear: External ear normal.   Nose: Nose normal.   Mouth/Throat: Uvula is midline, oropharynx is clear and moist and mucous membranes are normal. No uvula swelling. No posterior oropharyngeal edema or posterior " oropharyngeal erythema.   Eyes: Conjunctivae are normal. Right eye exhibits no discharge. Left eye exhibits no discharge. No scleral icterus.   Neck: Neck supple.   Pulmonary/Chest: Effort normal and breath sounds normal. No respiratory distress. He has no decreased breath sounds. He has no wheezes. He has no rhonchi. He has no rales.   Musculoskeletal: Normal range of motion.   Neurological: He is alert and oriented to person, place, and time. Coordination normal.   Skin: Skin is warm and dry. He is not diaphoretic. No pallor.        Patient with 3 distinct erythematous wheals to left forearm area, nonfluctuant, no evident discharge or drainage, localized well-demarcated erythema but no concerning extension of erythema, no ecchymosis, no significant breakage of skin, no visible stinger, left ear with evident insect bite to the top of auricle, no involvement of canal, mild diffuse edema, brisk capillary refill throughout   Psychiatric: He has a normal mood and affect.   Nursing note and vitals reviewed.        Assessment/Plan:   1. Bee sting, accidental or unintentional, initial encounter  - methylPREDNISolone (MEDROL DOSEPAK) 4 MG Tablet Therapy Pack; Take as directed on package.  Dispense one package.  Dispense: 1 Kit; Refill: 0  - triamcinolone acetonide (KENALOG) 0.1 % Cream; Apply to affected area BID  Dispense: 1 Tube; Refill: 0  Supportive care is reviewed with patient/caregiver - recommend to push PO fluids and electrolytes, no concern for cellulitic progression based on today's exam, encourage patient to watch for fevers chills and progression of redness from breakage of skin particularly with purulence, contact clinic with any of the symptoms and I will send antibiotic to pharmacy,Cautioned regarding potential side effects of steroid, avoid nsaids while using  Differential diagnosis, natural history, supportive care, and indications for immediate follow-up discussed.

## 2019-07-18 NOTE — LETTER
July 18, 2019       Patient: Camacho Cardona   YOB: 1980   Date of Visit: 7/18/2019         To Whom It May Concern:    It is my medical opinion that Camacho Cardona should be excused from work for today and tomorrow due to illness.    If you have any questions or concerns, please don't hesitate to call [phone]          Sincerely,          Zach Turcios P.A.-C.  Electronically Signed

## 2019-11-21 ENCOUNTER — HOSPITAL ENCOUNTER (EMERGENCY)
Facility: MEDICAL CENTER | Age: 39
End: 2019-11-22
Attending: EMERGENCY MEDICINE
Payer: COMMERCIAL

## 2019-11-21 DIAGNOSIS — R19.7 DIARRHEA, UNSPECIFIED TYPE: ICD-10-CM

## 2019-11-21 DIAGNOSIS — B34.9 ACUTE VIRAL SYNDROME: ICD-10-CM

## 2019-11-21 LAB
ALBUMIN SERPL BCP-MCNC: 4.3 G/DL (ref 3.2–4.9)
ALBUMIN/GLOB SERPL: 1.3 G/DL
ALP SERPL-CCNC: 87 U/L (ref 30–99)
ALT SERPL-CCNC: 29 U/L (ref 2–50)
ANION GAP SERPL CALC-SCNC: 10 MMOL/L (ref 0–11.9)
APPEARANCE UR: CLEAR
AST SERPL-CCNC: 25 U/L (ref 12–45)
BASOPHILS # BLD AUTO: 0.4 % (ref 0–1.8)
BASOPHILS # BLD: 0.02 K/UL (ref 0–0.12)
BILIRUB SERPL-MCNC: 1 MG/DL (ref 0.1–1.5)
BILIRUB UR QL STRIP.AUTO: ABNORMAL
BUN SERPL-MCNC: 14 MG/DL (ref 8–22)
CALCIUM SERPL-MCNC: 8.8 MG/DL (ref 8.5–10.5)
CHLORIDE SERPL-SCNC: 110 MMOL/L (ref 96–112)
CO2 SERPL-SCNC: 20 MMOL/L (ref 20–33)
COLOR UR: ABNORMAL
CREAT SERPL-MCNC: 0.94 MG/DL (ref 0.5–1.4)
EOSINOPHIL # BLD AUTO: 0.05 K/UL (ref 0–0.51)
EOSINOPHIL NFR BLD: 0.9 % (ref 0–6.9)
ERYTHROCYTE [DISTWIDTH] IN BLOOD BY AUTOMATED COUNT: 39.8 FL (ref 35.9–50)
GLOBULIN SER CALC-MCNC: 3.4 G/DL (ref 1.9–3.5)
GLUCOSE SERPL-MCNC: 95 MG/DL (ref 65–99)
GLUCOSE UR STRIP.AUTO-MCNC: NEGATIVE MG/DL
HCT VFR BLD AUTO: 48.1 % (ref 42–52)
HGB BLD-MCNC: 17 G/DL (ref 14–18)
IMM GRANULOCYTES # BLD AUTO: 0.02 K/UL (ref 0–0.11)
IMM GRANULOCYTES NFR BLD AUTO: 0.4 % (ref 0–0.9)
KETONES UR STRIP.AUTO-MCNC: ABNORMAL MG/DL
LEUKOCYTE ESTERASE UR QL STRIP.AUTO: NEGATIVE
LIPASE SERPL-CCNC: 31 U/L (ref 11–82)
LYMPHOCYTES # BLD AUTO: 1.71 K/UL (ref 1–4.8)
LYMPHOCYTES NFR BLD: 31.4 % (ref 22–41)
MCH RBC QN AUTO: 31.2 PG (ref 27–33)
MCHC RBC AUTO-ENTMCNC: 35.3 G/DL (ref 33.7–35.3)
MCV RBC AUTO: 88.3 FL (ref 81.4–97.8)
MICRO URNS: ABNORMAL
MONOCYTES # BLD AUTO: 0.66 K/UL (ref 0–0.85)
MONOCYTES NFR BLD AUTO: 12.1 % (ref 0–13.4)
NEUTROPHILS # BLD AUTO: 2.98 K/UL (ref 1.82–7.42)
NEUTROPHILS NFR BLD: 54.8 % (ref 44–72)
NITRITE UR QL STRIP.AUTO: NEGATIVE
NRBC # BLD AUTO: 0 K/UL
NRBC BLD-RTO: 0 /100 WBC
PH UR STRIP.AUTO: 5 [PH] (ref 5–8)
PLATELET # BLD AUTO: 188 K/UL (ref 164–446)
PMV BLD AUTO: 9.7 FL (ref 9–12.9)
POTASSIUM SERPL-SCNC: 3.5 MMOL/L (ref 3.6–5.5)
PROT SERPL-MCNC: 7.7 G/DL (ref 6–8.2)
PROT UR QL STRIP: NEGATIVE MG/DL
RBC # BLD AUTO: 5.45 M/UL (ref 4.7–6.1)
RBC UR QL AUTO: NEGATIVE
SODIUM SERPL-SCNC: 140 MMOL/L (ref 135–145)
SP GR UR STRIP.AUTO: 1.03
UROBILINOGEN UR STRIP.AUTO-MCNC: 1 MG/DL
WBC # BLD AUTO: 5.4 K/UL (ref 4.8–10.8)

## 2019-11-21 PROCEDURE — 96375 TX/PRO/DX INJ NEW DRUG ADDON: CPT

## 2019-11-21 PROCEDURE — 36415 COLL VENOUS BLD VENIPUNCTURE: CPT

## 2019-11-21 PROCEDURE — 80053 COMPREHEN METABOLIC PANEL: CPT

## 2019-11-21 PROCEDURE — 700111 HCHG RX REV CODE 636 W/ 250 OVERRIDE (IP): Performed by: EMERGENCY MEDICINE

## 2019-11-21 PROCEDURE — 81003 URINALYSIS AUTO W/O SCOPE: CPT

## 2019-11-21 PROCEDURE — 99284 EMERGENCY DEPT VISIT MOD MDM: CPT

## 2019-11-21 PROCEDURE — 96374 THER/PROPH/DIAG INJ IV PUSH: CPT

## 2019-11-21 PROCEDURE — 700105 HCHG RX REV CODE 258: Performed by: EMERGENCY MEDICINE

## 2019-11-21 PROCEDURE — 85025 COMPLETE CBC W/AUTO DIFF WBC: CPT

## 2019-11-21 PROCEDURE — 83690 ASSAY OF LIPASE: CPT

## 2019-11-21 RX ORDER — SODIUM CHLORIDE, SODIUM LACTATE, POTASSIUM CHLORIDE, CALCIUM CHLORIDE 600; 310; 30; 20 MG/100ML; MG/100ML; MG/100ML; MG/100ML
1000 INJECTION, SOLUTION INTRAVENOUS ONCE
Status: COMPLETED | OUTPATIENT
Start: 2019-11-21 | End: 2019-11-22

## 2019-11-21 RX ORDER — KETOROLAC TROMETHAMINE 30 MG/ML
30 INJECTION, SOLUTION INTRAMUSCULAR; INTRAVENOUS ONCE
Status: COMPLETED | OUTPATIENT
Start: 2019-11-21 | End: 2019-11-21

## 2019-11-21 RX ORDER — ONDANSETRON 2 MG/ML
4 INJECTION INTRAMUSCULAR; INTRAVENOUS ONCE
Status: COMPLETED | OUTPATIENT
Start: 2019-11-21 | End: 2019-11-21

## 2019-11-21 RX ADMIN — KETOROLAC TROMETHAMINE 30 MG: 30 INJECTION, SOLUTION INTRAMUSCULAR at 23:37

## 2019-11-21 RX ADMIN — SODIUM CHLORIDE, POTASSIUM CHLORIDE, SODIUM LACTATE AND CALCIUM CHLORIDE 1000 ML: 600; 310; 30; 20 INJECTION, SOLUTION INTRAVENOUS at 23:37

## 2019-11-21 RX ADMIN — ONDANSETRON 4 MG: 2 INJECTION INTRAMUSCULAR; INTRAVENOUS at 23:37

## 2019-11-21 ASSESSMENT — LIFESTYLE VARIABLES: DO YOU DRINK ALCOHOL: NO

## 2019-11-22 VITALS
HEIGHT: 76 IN | WEIGHT: 305.56 LBS | DIASTOLIC BLOOD PRESSURE: 41 MMHG | RESPIRATION RATE: 16 BRPM | TEMPERATURE: 97.5 F | BODY MASS INDEX: 37.21 KG/M2 | HEART RATE: 68 BPM | SYSTOLIC BLOOD PRESSURE: 109 MMHG | OXYGEN SATURATION: 94 %

## 2019-11-22 PROCEDURE — 99284 EMERGENCY DEPT VISIT MOD MDM: CPT

## 2019-11-22 PROCEDURE — 96375 TX/PRO/DX INJ NEW DRUG ADDON: CPT

## 2019-11-22 PROCEDURE — 36415 COLL VENOUS BLD VENIPUNCTURE: CPT

## 2019-11-22 PROCEDURE — 96374 THER/PROPH/DIAG INJ IV PUSH: CPT

## 2019-11-22 RX ORDER — ONDANSETRON 4 MG/1
4 TABLET, ORALLY DISINTEGRATING ORAL EVERY 6 HOURS PRN
Qty: 10 TAB | Refills: 0 | Status: SHIPPED | OUTPATIENT
Start: 2019-11-22 | End: 2021-02-12

## 2019-11-22 RX ORDER — NAPROXEN 500 MG/1
500 TABLET ORAL 2 TIMES DAILY WITH MEALS
Qty: 20 TAB | Refills: 0 | Status: SHIPPED | OUTPATIENT
Start: 2019-11-22 | End: 2019-12-02

## 2019-11-22 NOTE — ED NOTES
Pt ambulatory with steady gait to YL 57, pt in gown and on monitor, call light in reach, chart up for ERP.

## 2019-11-22 NOTE — ED PROVIDER NOTES
ED Provider Note    ED Provider Note      Primary care provider: Dustin Rodríguez M.D.    CHIEF COMPLAINT  Chief Complaint   Patient presents with   • Nausea/Vomiting/Diarrhea     Started monday.    • Flu Like Symptoms     Reports body aches, headaches, bone aches, weakness, chills.       HPI  Camacho Cardona is a 39 y.o. male who presents to the Emergency Department with chief complaint of nausea vomiting diarrhea patient also reports occasional sweats and feeling of weakness feeling as though all of his muscles are aching.  4 days of symptoms now reports frequent watery diarrhea multiple episodes of emesis no blood in emesis no blood in stool no measured fever no urinary complaints denies influenza vaccine this year however she is adamant that this is not the flu.  Patient feels as though this may be allergy to his breakfast bars because they recently change the ingredients.  Intermittent crampy abdominal pain that he rates it is moderate no modifying factors.      REVIEW OF SYSTEMS  10 systems reviewed and otherwise negative, pertinent positives and negatives listed in the history of present illness.    PAST MEDICAL HISTORY   Patient denies    SURGICAL HISTORY  patient denies any surgical history    SOCIAL HISTORY  Social History     Tobacco Use   • Smoking status: Never Smoker   • Smokeless tobacco: Current User     Types: Chew   Substance Use Topics   • Alcohol use: Yes     Alcohol/week: 1.0 oz     Types: 2 Cans of beer per week     Comment: 4-5 drinks per wk   • Drug use: No      Social History     Substance and Sexual Activity   Drug Use No       FAMILY HISTORY  Non-Contributory    CURRENT MEDICATIONS  Home Medications    **Home medications have not yet been reviewed for this encounter**         ALLERGIES  Allergies   Allergen Reactions   • Morphine    • Pcn [Penicillins]    • Robaxin [Methocarbamol] Anaphylaxis       PHYSICAL EXAM  VITAL SIGNS: /100   Pulse (!) 101   Temp 36.4 °C (97.5  "°F) (Temporal)   Resp 18   Ht 1.93 m (6' 4\")   Wt (!) 138.6 kg (305 lb 8.9 oz)   SpO2 97%   BMI 37.19 kg/m²   Pulse ox interpretation: I interpret this pulse ox as normal.  Constitutional: Alert and oriented x 3, minimal distress  HEENT: Atraumatic normocephalic, pupils are equal round reactive to light extraocular movements are intact. The nares is clear, external ears are normal, mouth shows moist mucous membranes  Neck: Supple, no JVD no tracheal deviation  Cardiovascular: Regular rate and rhythm no murmur rub or gallop 2+ pulses peripherally x4  Thorax & Lungs: No respiratory distress, no wheezes rales or rhonchi, No chest tenderness.   GI: Obese soft nontender nondistended positive bowel sounds, no peritoneal signs  Skin: Warm dry no acute rash or lesion  Musculoskeletal: Moving all extremities with full range and 5 of 5 strength, no acute  deformity  Neurologic: Cranial nerves III through XII are grossly intact, no sensory deficit, no cerebellar dysfunction   Psychiatric: Appropriate affect for situation at this time      DIAGNOSTIC STUDIES / PROCEDURES  LABS      Results for orders placed or performed during the hospital encounter of 11/21/19   CBC WITH DIFFERENTIAL   Result Value Ref Range    WBC 5.4 4.8 - 10.8 K/uL    RBC 5.45 4.70 - 6.10 M/uL    Hemoglobin 17.0 14.0 - 18.0 g/dL    Hematocrit 48.1 42.0 - 52.0 %    MCV 88.3 81.4 - 97.8 fL    MCH 31.2 27.0 - 33.0 pg    MCHC 35.3 33.7 - 35.3 g/dL    RDW 39.8 35.9 - 50.0 fL    Platelet Count 188 164 - 446 K/uL    MPV 9.7 9.0 - 12.9 fL    Neutrophils-Polys 54.80 44.00 - 72.00 %    Lymphocytes 31.40 22.00 - 41.00 %    Monocytes 12.10 0.00 - 13.40 %    Eosinophils 0.90 0.00 - 6.90 %    Basophils 0.40 0.00 - 1.80 %    Immature Granulocytes 0.40 0.00 - 0.90 %    Nucleated RBC 0.00 /100 WBC    Neutrophils (Absolute) 2.98 1.82 - 7.42 K/uL    Lymphs (Absolute) 1.71 1.00 - 4.80 K/uL    Monos (Absolute) 0.66 0.00 - 0.85 K/uL    Eos (Absolute) 0.05 0.00 - 0.51 K/uL " "   Baso (Absolute) 0.02 0.00 - 0.12 K/uL    Immature Granulocytes (abs) 0.02 0.00 - 0.11 K/uL    NRBC (Absolute) 0.00 K/uL   URINALYSIS,CULTURE IF INDICATED   Result Value Ref Range    Micro Urine Req see below        All labs reviewed by me.      RADIOLOGY  No orders to display     The radiologist's interpretation of all radiological studies have been reviewed by me.    COURSE & MEDICAL DECISION MAKING  Pertinent Labs & Imaging studies reviewed. (See chart for details)    10:37 PM - Patient seen and examined at bedside.         Patient noted to have slightly elevated blood pressure likely circumstantial secondary to presenting complaint. Referred to primary care physician for further evaluation.    Patient was given IV fluids based on tachycardia dry mucous membranes, oral hydration was not attempted due to insufficiency for hydration, after fluids had resolution of abnormal vital signs improvement of symptoms    Medical Decision Making: Patient with what seems to be viral syndrome muscle aches nausea vomiting diarrhea.  Slightly tachycardic at arrival he is given 1 dose of Toradol Zofran 1 L fluids with resolution of symptoms his labs are all very reassuring repeat exam is benign we discussed further testing with CAT scan of the abdomen due to his crampy abdominal pain that is having and we are both in agreement with this not necessary at this time.  Patient will return in 12 to 24 hours for ongoing abdominal pain sooner for any worsening the pain blood in his stool blood in his emesis any other acute symptoms or concerns otherwise discharged in stable and improved condition.    /41   Pulse 68   Temp 36.4 °C (97.5 °F) (Temporal)   Resp 16   Ht 1.93 m (6' 4\")   Wt (!) 138.6 kg (305 lb 8.9 oz)   SpO2 94%   BMI 37.19 kg/m²     No follow-up provider specified.    Discharge Medication List as of 11/22/2019  1:25 AM      START taking these medications    Details   ondansetron (ZOFRAN ODT) 4 MG TABLET " DISPERSIBLE Take 1 Tab by mouth every 6 hours as needed., Disp-10 Tab, R-0, Print Rx Paper      naproxen (NAPROSYN) 500 MG Tab Take 1 Tab by mouth 2 times a day, with meals for 10 days., Disp-20 Tab, R-0, Print Rx Paper             FINAL IMPRESSION  1. Acute viral syndrome Active   2. Diarrhea, unspecified type Active          This dictation has been created using voice recognition software and/or scribes. The accuracy of the dictation is limited by the abilities of the software and the expertise of the scribes. I expect there may be some errors of grammar and possibly content. I made every attempt to manually correct the errors within my dictation. However, errors related to voice recognition software and/or scribes may still exist and should be interpreted within the appropriate context.

## 2019-11-22 NOTE — ED TRIAGE NOTES
"Chief Complaint   Patient presents with   • Nausea/Vomiting/Diarrhea     Started monday.    • Flu Like Symptoms     Reports body aches, headaches, bone aches, weakness, chills.     /100   Pulse (!) 101   Temp 36.4 °C (97.5 °F) (Temporal)   Resp 18   Ht 1.93 m (6' 4\")   Wt (!) 138.6 kg (305 lb 8.9 oz)   SpO2 97%   BMI 37.19 kg/m²     PT to ER for above complaint.    Educated on triage process. Encouraged to alert staff to any changes.  "

## 2020-11-05 ENCOUNTER — HOSPITAL ENCOUNTER (OUTPATIENT)
Dept: LAB | Facility: MEDICAL CENTER | Age: 40
End: 2020-11-05
Payer: COMMERCIAL

## 2020-11-05 PROCEDURE — U0003 INFECTIOUS AGENT DETECTION BY NUCLEIC ACID (DNA OR RNA); SEVERE ACUTE RESPIRATORY SYNDROME CORONAVIRUS 2 (SARS-COV-2) (CORONAVIRUS DISEASE [COVID-19]), AMPLIFIED PROBE TECHNIQUE, MAKING USE OF HIGH THROUGHPUT TECHNOLOGIES AS DESCRIBED BY CMS-2020-01-R: HCPCS

## 2020-11-06 LAB
COVID ORDER STATUS COVID19: NORMAL
SARS-COV-2 RNA RESP QL NAA+PROBE: NOTDETECTED
SPECIMEN SOURCE: NORMAL

## 2020-11-12 ENCOUNTER — TELEMEDICINE (OUTPATIENT)
Dept: TELEHEALTH | Facility: TELEMEDICINE | Age: 40
End: 2020-11-12
Payer: COMMERCIAL

## 2020-11-12 DIAGNOSIS — R19.7 DIARRHEA, UNSPECIFIED TYPE: ICD-10-CM

## 2020-11-12 DIAGNOSIS — R52 BODY ACHES: ICD-10-CM

## 2020-11-12 DIAGNOSIS — B34.9 NONSPECIFIC SYNDROME SUGGESTIVE OF VIRAL ILLNESS: ICD-10-CM

## 2020-11-12 PROCEDURE — 99213 OFFICE O/P EST LOW 20 MIN: CPT | Mod: 95,CR | Performed by: PHYSICIAN ASSISTANT

## 2020-11-12 ASSESSMENT — ENCOUNTER SYMPTOMS
EYE PAIN: 0
CHILLS: 1
DIZZINESS: 0
HEADACHES: 0
SORE THROAT: 0
VOMITING: 0
NAUSEA: 0
CONSTIPATION: 0
ABDOMINAL PAIN: 0
FEVER: 1
DIARRHEA: 1
SHORTNESS OF BREATH: 0
COUGH: 0
MYALGIAS: 1
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
## 2020-11-12 NOTE — LETTER
November 12, 2020    To Whom It May Concern:         This is confirmation that Camacho Yfn Shrenzodakotah attended his scheduled appointment with Aubrey David P.A.-C. on 11/12/20.  I have evaluated this patient and advised him to remain at home and self quarantine.  Although he tested negative for covid recently, I believe he is contagious with a viral illness and should remain in quarantine until he is feeling symptom free for 48 hours (no fevers, body aches, diarrhea).         If you have any questions please do not hesitate to call me at the phone number listed below.    Sincerely,          Aubrey David P.A.-C.  [phone]

## 2021-02-12 ENCOUNTER — OFFICE VISIT (OUTPATIENT)
Dept: URGENT CARE | Facility: PHYSICIAN GROUP | Age: 41
End: 2021-02-12
Payer: COMMERCIAL

## 2021-02-12 VITALS
BODY MASS INDEX: 40.43 KG/M2 | HEART RATE: 76 BPM | TEMPERATURE: 98.8 F | DIASTOLIC BLOOD PRESSURE: 82 MMHG | HEIGHT: 74 IN | WEIGHT: 315 LBS | RESPIRATION RATE: 16 BRPM | SYSTOLIC BLOOD PRESSURE: 128 MMHG | OXYGEN SATURATION: 98 %

## 2021-02-12 DIAGNOSIS — R59.0 SUPRACLAVICULAR LYMPHADENOPATHY: ICD-10-CM

## 2021-02-12 DIAGNOSIS — H60.552 ACUTE REACTIVE OTITIS EXTERNA OF LEFT EAR: ICD-10-CM

## 2021-02-12 PROCEDURE — 99214 OFFICE O/P EST MOD 30 MIN: CPT | Performed by: PHYSICIAN ASSISTANT

## 2021-02-12 RX ORDER — NEOMYCIN SULFATE, POLYMYXIN B SULFATE AND HYDROCORTISONE 10; 3.5; 1 MG/ML; MG/ML; [USP'U]/ML
4 SUSPENSION/ DROPS AURICULAR (OTIC) 4 TIMES DAILY
Qty: 16 ML | Refills: 0 | Status: SHIPPED | OUTPATIENT
Start: 2021-02-12 | End: 2021-02-22

## 2021-02-12 ASSESSMENT — ENCOUNTER SYMPTOMS
CONSTIPATION: 0
COUGH: 0
DIARRHEA: 0
ABDOMINAL PAIN: 0
SHORTNESS OF BREATH: 0
MYALGIAS: 0
HEADACHES: 0
VOMITING: 0
NAUSEA: 0
FEVER: 0
EYE PAIN: 0
SORE THROAT: 0
CHILLS: 0
BRUISES/BLEEDS EASILY: 0

## 2021-02-12 ASSESSMENT — FIBROSIS 4 INDEX: FIB4 SCORE: 0.96

## 2021-02-12 ASSESSMENT — PAIN SCALES - GENERAL: PAINLEVEL: 2=MINIMAL-SLIGHT

## 2021-02-12 NOTE — PROGRESS NOTES
Subjective:   Camacho Cardona is a 40 y.o. male who presents for Bump (pt states he noticed a bump on the left side of his lower portion of his neck. Pt states yesterday he was at work and it seemed to be getting worse and bigger and now he is having face tingling on the left side. )      HPI:  This is a 40-year-old male presents to urgent care complaining of swollen bump at the base of the neck where it intersects the left shoulder has been going on for around 5 days and seems to be worse and tender.  He is also had intermittent paresthesias over the cheek and upper lip over the last 24 to 48 hours.  Notably he received his first dose of the Covid vaccine in the left arm, the same side where he has a swollen bump.  He is been noticing some mild malaise but no overt fevers, chills, body aches, or other symptoms.  Has not tried anything for this yet.    Review of Systems   Constitutional: Positive for malaise/fatigue. Negative for chills and fever.   HENT: Negative for congestion, ear pain and sore throat.    Eyes: Negative for pain.   Respiratory: Negative for cough and shortness of breath.    Cardiovascular: Negative for chest pain.   Gastrointestinal: Negative for abdominal pain, constipation, diarrhea, nausea and vomiting.   Genitourinary: Negative for dysuria.   Musculoskeletal: Negative for myalgias.   Skin: Negative for rash.   Neurological: Negative for headaches.   Endo/Heme/Allergies: Does not bruise/bleed easily.       Medications:    • neomycin-polymyxin-HC Susp    Allergies: Morphine, Pcn [penicillins], and Robaxin [methocarbamol]    Problem List: Camacho Cardona has Cephalalgia; Cerebral infarction (HCC); R Hemiparesthesia; Dizziness; History of concussion; Dehydration; and Epistaxis on their problem list.    Surgical History:  No past surgical history on file.    Past Social Hx: Camacho Cardona  reports that he has never smoked. His smokeless tobacco use includes chew.  "He reports current alcohol use of about 1.0 oz of alcohol per week. He reports that he does not use drugs.     Past Family Hx:  Camacho Cardona family history includes Cancer in his mother; Diabetes in his mother; Hypertension in his mother; Lung Disease in his mother.     Problem list, medications, and allergies reviewed by myself today in Epic.     Objective:     /82 (BP Location: Right arm, Patient Position: Sitting, BP Cuff Size: Adult)   Pulse 76   Temp 37.1 °C (98.8 °F) (Temporal)   Resp 16   Ht 1.88 m (6' 2\")   Wt (!) 144 kg (318 lb)   SpO2 98%   BMI 40.83 kg/m²     Physical Exam  Vitals reviewed.   Constitutional:       Appearance: Normal appearance.   HENT:      Head: Normocephalic and atraumatic.      Right Ear: External ear normal.      Left Ear: External ear normal.      Ears:      Comments: Cerumen plugs bilaterally unable to visualize TM, bilateral canals are erythematous, macerated and edematous, left way worse than right     Nose: Nose normal.      Mouth/Throat:      Mouth: Mucous membranes are moist.   Eyes:      Conjunctiva/sclera: Conjunctivae normal.      Pupils: Pupils are equal, round, and reactive to light.   Cardiovascular:      Rate and Rhythm: Normal rate.   Pulmonary:      Effort: Pulmonary effort is normal.   Musculoskeletal:      Cervical back: Normal range of motion. Tenderness (Mildly tender over lymph node) present.   Lymphadenopathy:      Cervical: Cervical adenopathy (Left-sided supraclavicular lymphadenopathy, 1 x 1 cm, mildly tender however mobile.  Not erythematous or fluctuant.  Mild anterior cervical chain lymphadenopathy as well) present.   Skin:     General: Skin is warm and dry.      Capillary Refill: Capillary refill takes less than 2 seconds.      Findings: No rash.   Neurological:      General: No focal deficit present.      Mental Status: He is alert and oriented to person, place, and time. Mental status is at baseline.      Cranial Nerves: No " cranial nerve deficit.      Coordination: Coordination normal.      Gait: Gait normal.      Deep Tendon Reflexes: Reflexes normal.      Comments: No facial droop, no altered sensation appreciable on exam         Assessment/Plan:     Diagnosis and associated orders:     1. Supraclavicular lymphadenopathy     2. Acute reactive otitis externa of left ear  neomycin-polymyxin-HC (PEDIOTIC HC) [id number] Suspension      Comments/MDM:     • I discussed with the patient and considered the possibility of malignancy as being the cause of the patient's lymphadenopathy but his history and physical and recent immunization in that arm make reactive lymphadenopathy far more likely option.  Following the recommendations from up-to-date I recommended the patient continue to monitor the swollen lymph node and if it does not spontaneously resolve in 4 weeks return for consideration of biopsy or further evaluation including lab work.  He demonstrated understanding of these instructions and agreed.  • Patient's facial paresthesias could also be related as the lymph node is pulling on the platysma's and could be radiating with some pain up to the side of the face.  It appears as if his pain is over the maxillary distribution of the trigeminal nerve.  When I look inside of his ear he has a significant cerumen plug with otitis externa on the left side and so I had the medical assistant lavage the ear.  The lavage was partially successful and I put the patient on antibiotic and steroid drops.  My thinking is that the canal inflammation is irritating the nerve causing the paresthesias.  I unclear whether this is related to his lymphadenopathy however I think watchful waiting for these is reasonable.  He has no neurologic deficit, no other cranial nerve deficits that I can appreciate on my exam.  • I recommended the patient try anti-inflammatories, 8 mg ibuprofen with food twice daily for the next 72 to 96 hours, increase his water intake,  consider ice versus heat to the lymph node and return to urgent care versus the ER if he notices any worsening of his symptoms, or new onset fever chills, or anything else concerning.         Differential diagnosis, natural history, supportive care, and indications for immediate follow-up discussed.    Advised the patient to follow-up with the primary care physician for recheck, reevaluation, and consideration of further management.    Please note that this dictation was created using voice recognition software. I have made a reasonable attempt to correct obvious errors, but I expect that there are errors of grammar and possibly content that I did not discover before finalizing the note.    This note was electronically signed by Aubrey David PA-C

## 2022-03-11 ENCOUNTER — APPOINTMENT (OUTPATIENT)
Dept: RADIOLOGY | Facility: MEDICAL CENTER | Age: 42
DRG: 923 | End: 2022-03-11
Attending: EMERGENCY MEDICINE
Payer: COMMERCIAL

## 2022-03-11 ENCOUNTER — HOSPITAL ENCOUNTER (INPATIENT)
Facility: MEDICAL CENTER | Age: 42
LOS: 2 days | DRG: 923 | End: 2022-03-13
Attending: EMERGENCY MEDICINE | Admitting: SURGERY
Payer: COMMERCIAL

## 2022-03-11 ENCOUNTER — APPOINTMENT (OUTPATIENT)
Dept: RADIOLOGY | Facility: MEDICAL CENTER | Age: 42
DRG: 923 | End: 2022-03-11
Payer: COMMERCIAL

## 2022-03-11 DIAGNOSIS — T14.90XA TRAUMA: ICD-10-CM
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
## 2022-03-11 PROBLEM — Z78.9 NO CONTRAINDICATION TO DEEP VEIN THROMBOSIS (DVT) PROPHYLAXIS: Status: ACTIVE | Noted: 2022-03-11

## 2022-03-11 PROBLEM — Z53.09 CONTRAINDICATION TO DEEP VEIN THROMBOSIS (DVT) PROPHYLAXIS: Status: ACTIVE | Noted: 2022-03-11

## 2022-03-11 PROBLEM — Z11.52 ENCOUNTER FOR SCREENING FOR COVID-19: Status: ACTIVE | Noted: 2022-03-11

## 2022-03-11 LAB
ABO + RH BLD: NORMAL
ABO GROUP BLD: NORMAL
ALBUMIN SERPL BCP-MCNC: 4.5 G/DL (ref 3.2–4.9)
ALBUMIN/GLOB SERPL: 1.6 G/DL
ALP SERPL-CCNC: 102 U/L (ref 30–99)
ALT SERPL-CCNC: 27 U/L (ref 2–50)
ANION GAP SERPL CALC-SCNC: 13 MMOL/L (ref 7–16)
AST SERPL-CCNC: 19 U/L (ref 12–45)
BILIRUB SERPL-MCNC: 0.7 MG/DL (ref 0.1–1.5)
BLD GP AB SCN SERPL QL: NORMAL
BUN SERPL-MCNC: 18 MG/DL (ref 8–22)
CALCIUM SERPL-MCNC: 9.5 MG/DL (ref 8.5–10.5)
CHLORIDE SERPL-SCNC: 107 MMOL/L (ref 96–112)
CK SERPL-CCNC: 137 U/L (ref 0–154)
CO2 SERPL-SCNC: 20 MMOL/L (ref 20–33)
CREAT SERPL-MCNC: 0.88 MG/DL (ref 0.5–1.4)
EKG IMPRESSION: NORMAL
ERYTHROCYTE [DISTWIDTH] IN BLOOD BY AUTOMATED COUNT: 39.8 FL (ref 35.9–50)
ETHANOL BLD-MCNC: <10.1 MG/DL (ref 0–10)
GLOBULIN SER CALC-MCNC: 2.9 G/DL (ref 1.9–3.5)
GLUCOSE SERPL-MCNC: 94 MG/DL (ref 65–99)
HCT VFR BLD AUTO: 44.9 % (ref 42–52)
HGB BLD-MCNC: 16 G/DL (ref 14–18)
MCH RBC QN AUTO: 31.7 PG (ref 27–33)
MCHC RBC AUTO-ENTMCNC: 35.6 G/DL (ref 33.7–35.3)
MCV RBC AUTO: 88.9 FL (ref 81.4–97.8)
PLATELET # BLD AUTO: 173 K/UL (ref 164–446)
PMV BLD AUTO: 9.6 FL (ref 9–12.9)
POTASSIUM SERPL-SCNC: 3.9 MMOL/L (ref 3.6–5.5)
PROT SERPL-MCNC: 7.4 G/DL (ref 6–8.2)
RBC # BLD AUTO: 5.05 M/UL (ref 4.7–6.1)
RH BLD: NORMAL
SODIUM SERPL-SCNC: 140 MMOL/L (ref 135–145)
TROPONIN T SERPL-MCNC: <6 NG/L (ref 6–19)
WBC # BLD AUTO: 7 K/UL (ref 4.8–10.8)

## 2022-03-11 PROCEDURE — 96374 THER/PROPH/DIAG INJ IV PUSH: CPT

## 2022-03-11 PROCEDURE — 82077 ASSAY SPEC XCP UR&BREATH IA: CPT

## 2022-03-11 PROCEDURE — 93005 ELECTROCARDIOGRAM TRACING: CPT | Performed by: EMERGENCY MEDICINE

## 2022-03-11 PROCEDURE — 85027 COMPLETE CBC AUTOMATED: CPT

## 2022-03-11 PROCEDURE — 80053 COMPREHEN METABOLIC PANEL: CPT

## 2022-03-11 PROCEDURE — 86850 RBC ANTIBODY SCREEN: CPT

## 2022-03-11 PROCEDURE — A9270 NON-COVERED ITEM OR SERVICE: HCPCS | Performed by: SURGERY

## 2022-03-11 PROCEDURE — 82962 GLUCOSE BLOOD TEST: CPT

## 2022-03-11 PROCEDURE — 700105 HCHG RX REV CODE 258: Performed by: SURGERY

## 2022-03-11 PROCEDURE — 84484 ASSAY OF TROPONIN QUANT: CPT

## 2022-03-11 PROCEDURE — 71260 CT THORAX DX C+: CPT

## 2022-03-11 PROCEDURE — 700117 HCHG RX CONTRAST REV CODE 255: Performed by: EMERGENCY MEDICINE

## 2022-03-11 PROCEDURE — 770001 HCHG ROOM/CARE - MED/SURG/GYN PRIV*

## 2022-03-11 PROCEDURE — 70450 CT HEAD/BRAIN W/O DYE: CPT

## 2022-03-11 PROCEDURE — 305950 HCHG YELLOW TRAUMA ACT PRE-NOTIFY NO CC

## 2022-03-11 PROCEDURE — 99223 1ST HOSP IP/OBS HIGH 75: CPT | Performed by: SURGERY

## 2022-03-11 PROCEDURE — 72125 CT NECK SPINE W/O DYE: CPT

## 2022-03-11 PROCEDURE — 36415 COLL VENOUS BLD VENIPUNCTURE: CPT

## 2022-03-11 PROCEDURE — 72128 CT CHEST SPINE W/O DYE: CPT

## 2022-03-11 PROCEDURE — 86901 BLOOD TYPING SEROLOGIC RH(D): CPT

## 2022-03-11 PROCEDURE — 72131 CT LUMBAR SPINE W/O DYE: CPT

## 2022-03-11 PROCEDURE — 700111 HCHG RX REV CODE 636 W/ 250 OVERRIDE (IP): Performed by: EMERGENCY MEDICINE

## 2022-03-11 PROCEDURE — 700102 HCHG RX REV CODE 250 W/ 637 OVERRIDE(OP): Performed by: SURGERY

## 2022-03-11 PROCEDURE — 92523 SPEECH SOUND LANG COMPREHEN: CPT

## 2022-03-11 PROCEDURE — 99285 EMERGENCY DEPT VISIT HI MDM: CPT

## 2022-03-11 PROCEDURE — 86900 BLOOD TYPING SEROLOGIC ABO: CPT

## 2022-03-11 PROCEDURE — 82550 ASSAY OF CK (CPK): CPT

## 2022-03-11 RX ORDER — OXYCODONE HYDROCHLORIDE 5 MG/1
2.5 TABLET ORAL
Status: DISCONTINUED | OUTPATIENT
Start: 2022-03-11 | End: 2022-03-13 | Stop reason: HOSPADM

## 2022-03-11 RX ORDER — SODIUM CHLORIDE, SODIUM LACTATE, POTASSIUM CHLORIDE, CALCIUM CHLORIDE 600; 310; 30; 20 MG/100ML; MG/100ML; MG/100ML; MG/100ML
INJECTION, SOLUTION INTRAVENOUS CONTINUOUS
Status: DISCONTINUED | OUTPATIENT
Start: 2022-03-11 | End: 2022-03-13 | Stop reason: HOSPADM

## 2022-03-11 RX ORDER — BISACODYL 10 MG
10 SUPPOSITORY, RECTAL RECTAL
Status: DISCONTINUED | OUTPATIENT
Start: 2022-03-11 | End: 2022-03-13 | Stop reason: HOSPADM

## 2022-03-11 RX ORDER — IBUPROFEN 200 MG
600-800 TABLET ORAL EVERY 6 HOURS PRN
COMMUNITY

## 2022-03-11 RX ORDER — ENEMA 19; 7 G/133ML; G/133ML
1 ENEMA RECTAL
Status: DISCONTINUED | OUTPATIENT
Start: 2022-03-11 | End: 2022-03-13 | Stop reason: HOSPADM

## 2022-03-11 RX ORDER — DOCUSATE SODIUM 100 MG/1
100 CAPSULE, LIQUID FILLED ORAL 2 TIMES DAILY
Status: DISCONTINUED | OUTPATIENT
Start: 2022-03-11 | End: 2022-03-13 | Stop reason: HOSPADM

## 2022-03-11 RX ORDER — ONDANSETRON 4 MG/1
4 TABLET, ORALLY DISINTEGRATING ORAL EVERY 4 HOURS PRN
Status: DISCONTINUED | OUTPATIENT
Start: 2022-03-11 | End: 2022-03-13 | Stop reason: HOSPADM

## 2022-03-11 RX ORDER — OXYCODONE HYDROCHLORIDE 5 MG/1
5 TABLET ORAL
Status: DISCONTINUED | OUTPATIENT
Start: 2022-03-11 | End: 2022-03-13 | Stop reason: HOSPADM

## 2022-03-11 RX ORDER — ACETAMINOPHEN 325 MG/1
650 TABLET ORAL EVERY 6 HOURS
Status: DISCONTINUED | OUTPATIENT
Start: 2022-03-11 | End: 2022-03-13 | Stop reason: HOSPADM

## 2022-03-11 RX ORDER — ONDANSETRON 2 MG/ML
4 INJECTION INTRAMUSCULAR; INTRAVENOUS EVERY 4 HOURS PRN
Status: DISCONTINUED | OUTPATIENT
Start: 2022-03-11 | End: 2022-03-13 | Stop reason: HOSPADM

## 2022-03-11 RX ORDER — CELECOXIB 200 MG/1
200 CAPSULE ORAL 2 TIMES DAILY PRN
Status: DISCONTINUED | OUTPATIENT
Start: 2022-03-16 | End: 2022-03-13 | Stop reason: HOSPADM

## 2022-03-11 RX ORDER — HYDROMORPHONE HYDROCHLORIDE 1 MG/ML
INJECTION, SOLUTION INTRAMUSCULAR; INTRAVENOUS; SUBCUTANEOUS
Status: COMPLETED | OUTPATIENT
Start: 2022-03-11 | End: 2022-03-11

## 2022-03-11 RX ORDER — AMOXICILLIN 250 MG
1 CAPSULE ORAL NIGHTLY
Status: DISCONTINUED | OUTPATIENT
Start: 2022-03-11 | End: 2022-03-13 | Stop reason: HOSPADM

## 2022-03-11 RX ORDER — POLYETHYLENE GLYCOL 3350 17 G/17G
1 POWDER, FOR SOLUTION ORAL 2 TIMES DAILY
Status: DISCONTINUED | OUTPATIENT
Start: 2022-03-11 | End: 2022-03-13 | Stop reason: HOSPADM

## 2022-03-11 RX ORDER — CELECOXIB 200 MG/1
200 CAPSULE ORAL 2 TIMES DAILY
Status: DISCONTINUED | OUTPATIENT
Start: 2022-03-11 | End: 2022-03-13 | Stop reason: HOSPADM

## 2022-03-11 RX ORDER — HYDROMORPHONE HYDROCHLORIDE 1 MG/ML
0.25 INJECTION, SOLUTION INTRAMUSCULAR; INTRAVENOUS; SUBCUTANEOUS
Status: DISCONTINUED | OUTPATIENT
Start: 2022-03-11 | End: 2022-03-12

## 2022-03-11 RX ORDER — ACETAMINOPHEN 325 MG/1
650 TABLET ORAL EVERY 6 HOURS PRN
Status: DISCONTINUED | OUTPATIENT
Start: 2022-03-16 | End: 2022-03-13 | Stop reason: HOSPADM

## 2022-03-11 RX ORDER — AMOXICILLIN 250 MG
1 CAPSULE ORAL
Status: DISCONTINUED | OUTPATIENT
Start: 2022-03-11 | End: 2022-03-13 | Stop reason: HOSPADM

## 2022-03-11 RX ADMIN — ACETAMINOPHEN 650 MG: 325 TABLET, FILM COATED ORAL at 23:45

## 2022-03-11 RX ADMIN — IOHEXOL 100 ML: 350 INJECTION, SOLUTION INTRAVENOUS at 12:34

## 2022-03-11 RX ADMIN — SODIUM CHLORIDE, POTASSIUM CHLORIDE, SODIUM LACTATE AND CALCIUM CHLORIDE: 600; 310; 30; 20 INJECTION, SOLUTION INTRAVENOUS at 13:35

## 2022-03-11 RX ADMIN — HYDROMORPHONE HYDROCHLORIDE 0.5 MG: 1 INJECTION, SOLUTION INTRAMUSCULAR; INTRAVENOUS; SUBCUTANEOUS at 12:03

## 2022-03-11 RX ADMIN — OXYCODONE 5 MG: 5 TABLET ORAL at 20:15

## 2022-03-11 RX ADMIN — ACETAMINOPHEN 650 MG: 325 TABLET, FILM COATED ORAL at 17:14

## 2022-03-11 RX ADMIN — ACETAMINOPHEN 650 MG: 325 TABLET, FILM COATED ORAL at 13:35

## 2022-03-11 RX ADMIN — CELECOXIB 200 MG: 200 CAPSULE ORAL at 17:14

## 2022-03-11 RX ADMIN — DOCUSATE SODIUM 100 MG: 100 CAPSULE ORAL at 17:14

## 2022-03-11 RX ADMIN — POLYETHYLENE GLYCOL 3350 1 PACKET: 17 POWDER, FOR SOLUTION ORAL at 17:15

## 2022-03-11 RX ADMIN — OXYCODONE 5 MG: 5 TABLET ORAL at 23:47

## 2022-03-11 RX ADMIN — OXYCODONE 5 MG: 5 TABLET ORAL at 16:41

## 2022-03-11 ASSESSMENT — LIFESTYLE VARIABLES
DOES PATIENT WANT TO STOP DRINKING: NO
EVER HAD A DRINK FIRST THING IN THE MORNING TO STEADY YOUR NERVES TO GET RID OF A HANGOVER: NO
TOTAL SCORE: 0
AVERAGE NUMBER OF DAYS PER WEEK YOU HAVE A DRINK CONTAINING ALCOHOL: 0
CONSUMPTION TOTAL: NEGATIVE
HAVE PEOPLE ANNOYED YOU BY CRITICIZING YOUR DRINKING: NO
TOTAL SCORE: 0
TOTAL SCORE: 0
HAVE YOU EVER FELT YOU SHOULD CUT DOWN ON YOUR DRINKING: NO
ON A TYPICAL DAY WHEN YOU DRINK ALCOHOL HOW MANY DRINKS DO YOU HAVE: 0
HOW MANY TIMES IN THE PAST YEAR HAVE YOU HAD 5 OR MORE DRINKS IN A DAY: 0
EVER FELT BAD OR GUILTY ABOUT YOUR DRINKING: NO

## 2022-03-11 ASSESSMENT — PAIN DESCRIPTION - PAIN TYPE
TYPE: ACUTE PAIN
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
## 2022-03-11 NOTE — ED NOTES
Pt very weak bilaterally able to feel RN touch him but decreased on both sides able to wiggle fingers and toes cannot lift legs or arms off of the bed. Pt educated on use of call light and demonstrated ability to use it. All questions answered wife updated per pt request

## 2022-03-11 NOTE — LETTER
"  FORM C-4:  EMPLOYEE’S CLAIM FOR COMPENSATION/ REPORT OF INITIAL TREATMENT  EMPLOYEE’S CLAIM - PROVIDE ALL INFORMATION REQUESTED   First Name Camacho Last Name rBendan Birthdate 1980  Sex male Claim Number   Home Address [address] [place]    [facility]             Zip [address]                                   Age  41 y.o. Height  1.905 m (6' 3\") Weight  (!) 145 kg (320 lb) [facility]     Mailing Address [address]  [facility]              [place] [address] Telephone  [phone] (home)  Primary Language Spoken  English   Insurer   Third Party   [employer] Employee's Occupation (Job Title) When Injury or Occupational Disease Occurred  site    Employer's Name [employer] Telephone [phone]    Employer Address [address]] Zip [address]   Date of Injury  3/11/2022       Hour of Injury  9:30 AM Date Employer Notified  3/11/2022 Last Day of Work after Injury or Occupational Disease  3/11/2022 Supervisor to Whom Injury Reported  Luci wallace   Address or Location of Accident (if applicable) Work [1]   What were you doing at the time of accident? (if applicable) resetting breaker    How did this injury or occupational disease occur? Be specific and answer in detail. Use additional sheet if necessary)  assister Ray w/resetting breakers in [employer], went [place] [employer] [address] [employer], flipped light switch style breaker. As soon as depressed garcía flew and knocked me to the ground. Woke-up and called for help. Passed out again   If you believe that you have an occupational disease, when did you first have knowledge of the disability and it relationship to your employment? na Witnesses to the Accident  na   Nature of Injury or Occupational Disease  Workers' Compensation Part(s) of Body Injured or Affected  Abdomen Including Groin, Lower Back Area (Lumbar Area & Lumbo-Sacral), N/A    I CERTIFY THAT THE ABOVE IS " TRUE AND CORRECT TO THE BEST OF MY KNOWLEDGE AND THAT I HAVE PROVIDED THIS INFORMATION IN ORDER TO OBTAIN THE BENEFITS OF [place] CHAPTER 617 OF [place]).  I HEREBY AUTHORIZE ANY PHYSICIAN, CHIROPRACTOR, SURGEON, PRACTITIONER, OR OTHER PERSON, ANY HOSPITAL, INCLUDING [facility] OR [facility], ANY MEDICAL SERVICE ORGANIZATION, ANY INSURANCE COMPANY, OR OTHER INSTITUTION OR ORGANIZATION TO RELEASE TO EACH OTHER, ANY MEDICAL OR OTHER INFORMATION, INCLUDING BENEFITS PAID OR PAYABLE, PERTINENT TO THIS INJURY OR DISEASE, EXCEPT INFORMATION RELATIVE TO DIAGNOSIS, TREATMENT AND/OR COUNSELING FOR AIDS, PSYCHOLOGICAL CONDITIONS, ALCOHOL OR CONTROLLED SUBSTANCES, FOR WHICH I MUST GIVE SPECIFIC AUTHORIZATION.  A PHOTOSTAT OF THIS AUTHORIZATION SHALL BE AS VALID AS THE ORIGINAL.  Date  03/11/2022                     Place   [facility]                                    Employee’s Signature   THIS REPORT MUST BE COMPLETED AND MAILED WITHIN 3 WORKING DAYS OF TREATMENT   Place [facility], EMERGENCY DEPT                       Name of Facility [facility]   Date  3/11/2022 Diagnosis  (Y33.XXXA) Injury of unknown intent by electrocution, initial encounter Is there evidence the injured employee was under the influence of alcohol and/or another controlled substance at the time of accident?   Hour  12:00 PM Description of Injury or Disease  Injury of unknown intent by electrocution, initial encounter No   Treatment  Admission, monitoring, PT OT evaluation  Have you advised the patient to remain off work five days or more?         No   X-Ray Findings  Negative If Yes   From Date    To Date      From information given by the employee, together with medical evidence, can you directly connect this injury or occupational disease as job incurred? Yes If No, is employee capable of: Full Duty  No Modified Duty    "   Is additional medical care by a physician indicated? Yes If Modified Duty, Specify any Limitations / Restrictions   Duty restrictions and return to work to be determined by occupational medicine.  At this time, unable to predict time to recovery.   Do you know of any previous injury or disease contributing to this condition or occupational disease? No    Date 3/11/2022 Print Doctor’s Name Florence Jordan certify the employer’s copy of this form was mailed on:   Address [address] [place] [id number] [phone] INSURER’S USE ONLY   Provider’s Tax ID Number [id number] Telephone Dept: [phone]    Doctor’s Signature  e-FLORENCE Wallace M.D. Degree     M.D.      Form C-4 (rev.10/07)                                                                         BRIEF DESCRIPTION OF RIGHTS AND BENEFITS  (Pursuant to [place]RS 616C.050)    Notice of Injury or Occupational Disease (Incident Report Form C-1): If an injury or occupational disease (OD) arises out of and in the course of employment, you must provide written notice to your employer as soon as practicable, but no later than 7 days after the accident or OD. Your employer shall maintain a sufficient supply of the required forms.    Claim for Compensation (Form C-4): If medical treatment is sought, the form C-4 is available at the place of initial treatment. A completed \"Claim for Compensation\" (Form C-4) must be filed within 90 days after an accident or OD. The treating physician or chiropractor must, within 3 working days after treatment, complete and mail to the employer, the employer's insurer and third-party , the Claim for Compensation.    Medical Treatment: If you require medical treatment for your on-the-job injury or OD, you may be required to select a physician or chiropractor from a list provided by your workers’ compensation insurer, if it has contracted with an Organization for Managed Care (MCO) or Preferred Provider Organization " (PPO) or providers of health care. If your employer has not entered into a contract with an MCO or PPO, you may select a physician or chiropractor from the Panel of Physicians and Chiropractors. Any medical costs related to your industrial injury or OD will be paid by your insurer.    Temporary Total Disability (TTD): If your doctor has certified that you are unable to work for a period of at least 5 consecutive days, or 5 cumulative days in a 20-day period, or places restrictions on you that your employer does not accommodate, you may be entitled to TTD compensation.    Temporary Partial Disability (TPD): If the wage you receive upon reemployment is less than the compensation for TTD to which you are entitled, the insurer may be required to pay you TPD compensation to make up the difference. TPD can only be paid for a maximum of 24 months.    Permanent Partial Disability (PPD): When your medical condition is stable and there is an indication of a PPD as a result of your injury or OD, within 30 days, your insurer must arrange for an evaluation by a rating physician or chiropractor to determine the degree of your PPD. The amount of your PPD award depends on the date of injury, the results of the PPD evaluation, your age and wage.    Permanent Total Disability (PTD): If you are medically certified by a treating physician or chiropractor as permanently and totally disabled and have been granted a PTD status by your insurer, you are entitled to receive monthly benefits not to exceed 66 2/3% of your average monthly wage. The amount of your PTD payments is subject to reduction if you previously received a lump-sum PPD award.    Vocational Rehabilitation Services: You may be eligible for vocational rehabilitation services if you are unable to return to the job due to a permanent physical impairment or permanent restrictions as a result of your injury or occupational disease.    Transportation and Per Brenda Reimbursement:  You may be eligible for travel expenses and per davon associated with medical treatment.    Reopening: You may be able to reopen your claim if your condition worsens after claim closure.     Appeal Process: If you disagree with a written determination issued by the insurer or the insurer does not respond to your request, you may appeal to the Department of Administration, , by following the instructions contained in your determination letter. You must appeal the determination within 70 days from the date of the determination letter at [address], [address], [place], [place] [address], or [address], [place], [place] [address]. If you disagree with the  decision, you may appeal to the Department of Administration, . You must file your appeal within 30 days from the date of the  decision letter at [address], [address], [place], [place] [address], or [address] [facility] [address], [place], [place] [address]. If you disagree with a decision of an , you may file a petition for judicial review with the District Court. You must do so within 30 days of the Appeal Officer’s decision. You may be represented by an  at your own expense or you may contact the [facility] for possible representation.    [place]  for Injured Workers (NAIW): If you disagree with a  decision, you may request that NAIW represent you without charge at an  Hearing. For information regarding denial of benefits, you may contact the [facility] at: [address], [address], [place] [address], [phone], or [address], [facility] [address], [place] [address], [phone]    To File a Complaint with the Division: If you wish to file a complaint with the  of the Division of Industrial Relations (DIR),  please contact the Workers’ Compensation Section, [address], [place], [place] [address], telephone [phone], or [address], [address], [place], [place] [address], telephone [phone].    For assistance with Workers’ Compensation Issues: You may contact the [facility] Office for Consumer Health Assistance, [address], [address], [place], [place] [address], Toll Free 1-715.357.2600, Web site: http://[facility].[place].gov/Programs/MAURICIO E-mail: [email or web address].[place].gov  D-2 (rev. 10/20)              __________________________________________________________________                                    _________________            Employee Name / Signature                                                                                                                            Date

## 2022-03-11 NOTE — ED NOTES
Per [facility] report:     Patient is Jonathan a 40 y/o male attict at [facility] when he touched electrical line, electrocuted with 277 volts. Other  called for help, slumped and hit floor, likely probably LOC but patient remembers event. Patient is + for head ache, globalized weakness, left sided neck pain, c-collar in place, numbness to the right side. Right index finger entry wound, no exit wound. Short term memory loss, GCS 14.      Fentanyl 200 mcg  Versed 2mg   No pre hospital fluids    Meds: none  History: electrocutes x 2 previously by lightening   Allergies: none

## 2022-03-11 NOTE — LETTER
"  FORM C-4:  EMPLOYEE’S CLAIM FOR COMPENSATION/ REPORT OF INITIAL TREATMENT  EMPLOYEE’S CLAIM - PROVIDE ALL INFORMATION REQUESTED   First Name Fremont Last Name Twenty-Four Birthdate 1980  Sex male Claim Number   Home Address [address]   [place]             [place] [address]                                   Age  41 y.o. Height  1.905 m (6' 3\") Weight  (!) 145 kg (320 lb) N  [id number]   Mailing Address [address]  [facility]              [place] [address] Telephone  [phone] (home)  Primary Language Spoken   Insurer  *** Third Party   [employer] Employee's Occupation (Job Title) When Injury or Occupational Disease Occurred  site    Employer's Name [employer] Telephone [phone]    Employer Address [address]] Zip [address]   Date of Injury  3/11/2022       Hour of Injury  9:30 AM Date Employer Notified  3/11/2022 Last Day of Work after Injury or Occupational Disease  3/11/2022 Supervisor to Whom Injury Reported  Luci wallace   Address or Location of Accident (if applicable) Work [1]   What were you doing at the time of accident? (if applicable) resetting breaker    How did this injury or occupational disease occur? Be specific and answer in detail. Use additional sheet if necessary)  assister Ray w/resetting breakers in [place], went [place] [employer] [address], flipped light switch style breaker. As soon as depressed garcía flew and knocked me to the ground. Woke-up and called for help. Passed out again   If you believe that you have an occupational disease, when did you first have knowledge of the disability and it relationship to your employment? na Witnesses to the Accident  na   Nature of Injury or Occupational Disease  Workers' Compensation Part(s) of Body Injured or Affected  Abdomen Including Groin, Lower Back Area (Lumbar Area & Lumbo-Sacral), N/A    I CERTIFY THAT THE ABOVE IS TRUE AND " CORRECT TO THE BEST OF MY KNOWLEDGE AND THAT I HAVE PROVIDED THIS INFORMATION IN ORDER TO OBTAIN THE BENEFITS OF [place], [place] CHAPTER 617 OF [place]).  I HEREBY AUTHORIZE ANY PHYSICIAN, CHIROPRACTOR, SURGEON, PRACTITIONER, OR OTHER PERSON, ANY HOSPITAL, INCLUDING [facility] OR [facility], ANY MEDICAL SERVICE ORGANIZATION, ANY INSURANCE COMPANY, OR OTHER INSTITUTION OR ORGANIZATION TO RELEASE TO EACH OTHER, ANY MEDICAL OR OTHER INFORMATION, INCLUDING BENEFITS PAID OR PAYABLE, PERTINENT TO THIS INJURY OR DISEASE, EXCEPT INFORMATION RELATIVE TO DIAGNOSIS, TREATMENT AND/OR COUNSELING FOR AIDS, PSYCHOLOGICAL CONDITIONS, ALCOHOL OR CONTROLLED SUBSTANCES, FOR WHICH I MUST GIVE SPECIFIC AUTHORIZATION.  A PHOTOSTAT OF THIS AUTHORIZATION SHALL BE AS VALID AS THE ORIGINAL.  Date                                      Place                                                                             Employee’s Signature   THIS REPORT MUST BE COMPLETED AND MAILED WITHIN 3 WORKING DAYS OF TREATMENT   Place [facility], EMERGENCY DEPT                       Name of Facility [facility]   Date  3/4/2022 Diagnosis  (Y33.XXXA) Injury of unknown intent by electrocution, initial encounter Is there evidence the injured employee was under the influence of alcohol and/or another controlled substance at the time of accident?   Hour  2:17 PM Description of Injury or Disease  Injury of unknown intent by electrocution, initial encounter     Treatment     Have you advised the patient to remain off work five days or more?             X-Ray Findings    If Yes   From Date    To Date      From information given by the employee, together with medical evidence, can you directly connect this injury or occupational disease as job incurred?   If No, is employee capable of: Full Duty    Modified Duty      Is additional  "medical care by a physician indicated?   If Modified Duty, Specify any Limitations / Restrictions       Do you know of any previous injury or disease contributing to this condition or occupational disease?      Date 3/11/2022 Print Doctor’s Name Nikki Luci LADI HEATHER certify the employer’s copy of this form was mailed on:   Address [address] [place] [address]  [place] [id number] [phone] INSURER’S USE ONLY   Provider’s Tax ID Number [id number] Telephone Dept: [phone]    Doctor’s Signature   Degree        Form C-4 (rev.10/07)                                                                         BRIEF DESCRIPTION OF RIGHTS AND BENEFITS  ([place]ursuant to [place] 616C.050)    Notice of Injury or Occupational Disease (Incident Report Form C-1): If an injury or occupational disease (OD) arises out of and in the course of employment, you must provide written notice to your employer as soon as practicable, but no later than 7 days after the accident or OD. Your employer shall maintain a sufficient supply of the required forms.    Claim for Compensation (Form C-4): If medical treatment is sought, the form C-4 is available at the place of initial treatment. A completed \"Claim for Compensation\" (Form C-4) must be filed within 90 days after an accident or OD. The treating physician or chiropractor must, within 3 working days after treatment, complete and mail to the employer, the employer's insurer and third-party , the Claim for Compensation.    Medical Treatment: If you require medical treatment for your on-the-job injury or OD, you may be required to select a physician or chiropractor from a list provided by your workers’ compensation insurer, if it has contracted with an Organization for Managed Care (MCO) or Preferred Provider Organization (PPO) or providers of health care. If your employer has not entered into a contract with an MCO or PPO, you may select a physician or chiropractor from the Panel of Physicians and " Chiropractors. Any medical costs related to your industrial injury or OD will be paid by your insurer.    Temporary Total Disability (TTD): If your doctor has certified that you are unable to work for a period of at least 5 consecutive days, or 5 cumulative days in a 20-day period, or places restrictions on you that your employer does not accommodate, you may be entitled to TTD compensation.    Temporary Partial Disability (TPD): If the wage you receive upon reemployment is less than the compensation for TTD to which you are entitled, the insurer may be required to pay you TPD compensation to make up the difference. TPD can only be paid for a maximum of 24 months.    Permanent Partial Disability (PPD): When your medical condition is stable and there is an indication of a PPD as a result of your injury or OD, within 30 days, your insurer must arrange for an evaluation by a rating physician or chiropractor to determine the degree of your PPD. The amount of your PPD award depends on the date of injury, the results of the PPD evaluation, your age and wage.    Permanent Total Disability (PTD): If you are medically certified by a treating physician or chiropractor as permanently and totally disabled and have been granted a PTD status by your insurer, you are entitled to receive monthly benefits not to exceed 66 2/3% of your average monthly wage. The amount of your PTD payments is subject to reduction if you previously received a lump-sum PPD award.    Vocational Rehabilitation Services: You may be eligible for vocational rehabilitation services if you are unable to return to the job due to a permanent physical impairment or permanent restrictions as a result of your injury or occupational disease.    Transportation and Per Davon Reimbursement: You may be eligible for travel expenses and per davon associated with medical treatment.    Reopening: You may be able to reopen your claim if your condition worsens after claim  closure.     Appeal Process: If you disagree with a written determination issued by the insurer or the insurer does not respond to your request, you may appeal to the Department of Administration, , by following the instructions contained in your determination letter. You must appeal the determination within 70 days from the date of the determination letter at [address], [address], [place], [place] [address], or [address] [facility], [address], [place], [place] [address]. If you disagree with the  decision, you may appeal to the Department of Administration, . You must file your appeal within 30 days from the date of the  decision letter at [address], [address], [place], [place] [address], or [address] [facility], [facility] [address], [place], [place] [address]. If you disagree with a decision of an , you may file a petition for judicial review with the District Court. You must do so within 30 days of the Appeal Officer’s decision. You may be represented by an  at your own expense or you may contact the [employer] for possible representation.    [place]  for In[place] Workers ([place]IW): If you disagree with a  decision, you may request that NAIW represent you without charge at an  Hearing. For information regarding denial of benefits, you may contact the [employer] at: [address], [address], [place] [address], [phone], or [address] [facility], [address], [place] [address], [phone]    To File a Complaint with the Division: If you wish to file a complaint with the  of the Division of Industrial Relations (DIR),  please contact the Workers’ Compensation Section, [address] [facility], [facility] [address], [place], [place] [address], telephone [phone]) [phone], or [address], [place], [place] [address], telephone [phone].    For assistance with Workers’  Compensation Issues: You may contact the [facility] Office for Consumer Health Assistance, [facility] [facility], [facility] [address], [place] [address], Toll Free 1-707.721.1021, Web site: http://[facility].[place].gov/Programs/MAURICIO E-mail: [email or web address].gov  D-2 (rev. 10/20)              __________________________________________________________________                                    _________________            Employee Name / Signature                                                                                                                            Date
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
## 2022-03-12 ENCOUNTER — APPOINTMENT (OUTPATIENT)
Dept: RADIOLOGY | Facility: MEDICAL CENTER | Age: 42
DRG: 923 | End: 2022-03-12
Attending: SURGERY
Payer: COMMERCIAL

## 2022-03-12 LAB
ANION GAP SERPL CALC-SCNC: 10 MMOL/L (ref 7–16)
ANION GAP SERPL CALC-SCNC: 10 MMOL/L (ref 7–16)
BASOPHILS # BLD AUTO: 0.5 % (ref 0–1.8)
BASOPHILS # BLD: 0.03 K/UL (ref 0–0.12)
BUN SERPL-MCNC: 16 MG/DL (ref 8–22)
BUN SERPL-MCNC: 18 MG/DL (ref 8–22)
CALCIUM SERPL-MCNC: 8.8 MG/DL (ref 8.5–10.5)
CALCIUM SERPL-MCNC: 9 MG/DL (ref 8.5–10.5)
CHLORIDE SERPL-SCNC: 105 MMOL/L (ref 96–112)
CHLORIDE SERPL-SCNC: 107 MMOL/L (ref 96–112)
CK SERPL-CCNC: 86 U/L (ref 0–154)
CK SERPL-CCNC: 91 U/L (ref 0–154)
CO2 SERPL-SCNC: 23 MMOL/L (ref 20–33)
CO2 SERPL-SCNC: 23 MMOL/L (ref 20–33)
CREAT SERPL-MCNC: 0.83 MG/DL (ref 0.5–1.4)
CREAT SERPL-MCNC: 0.85 MG/DL (ref 0.5–1.4)
EKG IMPRESSION: NORMAL
EKG IMPRESSION: NORMAL
EOSINOPHIL # BLD AUTO: 0.16 K/UL (ref 0–0.51)
EOSINOPHIL NFR BLD: 2.7 % (ref 0–6.9)
ERYTHROCYTE [DISTWIDTH] IN BLOOD BY AUTOMATED COUNT: 41.4 FL (ref 35.9–50)
GLUCOSE BLD STRIP.AUTO-MCNC: 86 MG/DL (ref 65–99)
GLUCOSE BLD STRIP.AUTO-MCNC: 94 MG/DL (ref 65–99)
GLUCOSE SERPL-MCNC: 108 MG/DL (ref 65–99)
GLUCOSE SERPL-MCNC: 93 MG/DL (ref 65–99)
HCT VFR BLD AUTO: 41.8 % (ref 42–52)
HGB BLD-MCNC: 14.6 G/DL (ref 14–18)
IMM GRANULOCYTES # BLD AUTO: 0.02 K/UL (ref 0–0.11)
IMM GRANULOCYTES NFR BLD AUTO: 0.3 % (ref 0–0.9)
LYMPHOCYTES # BLD AUTO: 3.06 K/UL (ref 1–4.8)
LYMPHOCYTES NFR BLD: 52 % (ref 22–41)
MCH RBC QN AUTO: 31.9 PG (ref 27–33)
MCHC RBC AUTO-ENTMCNC: 34.9 G/DL (ref 33.7–35.3)
MCV RBC AUTO: 91.5 FL (ref 81.4–97.8)
MONOCYTES # BLD AUTO: 0.47 K/UL (ref 0–0.85)
MONOCYTES NFR BLD AUTO: 8 % (ref 0–13.4)
NEUTROPHILS # BLD AUTO: 2.14 K/UL (ref 1.82–7.42)
NEUTROPHILS NFR BLD: 36.5 % (ref 44–72)
NRBC # BLD AUTO: 0 K/UL
NRBC BLD-RTO: 0 /100 WBC
PLATELET # BLD AUTO: 150 K/UL (ref 164–446)
PMV BLD AUTO: 9.7 FL (ref 9–12.9)
POTASSIUM SERPL-SCNC: 4 MMOL/L (ref 3.6–5.5)
POTASSIUM SERPL-SCNC: 4.2 MMOL/L (ref 3.6–5.5)
RBC # BLD AUTO: 4.57 M/UL (ref 4.7–6.1)
SODIUM SERPL-SCNC: 138 MMOL/L (ref 135–145)
SODIUM SERPL-SCNC: 140 MMOL/L (ref 135–145)
WBC # BLD AUTO: 5.9 K/UL (ref 4.8–10.8)

## 2022-03-12 PROCEDURE — 700105 HCHG RX REV CODE 258: Performed by: SURGERY

## 2022-03-12 PROCEDURE — 82962 GLUCOSE BLOOD TEST: CPT

## 2022-03-12 PROCEDURE — 80048 BASIC METABOLIC PNL TOTAL CA: CPT | Mod: 91

## 2022-03-12 PROCEDURE — 700102 HCHG RX REV CODE 250 W/ 637 OVERRIDE(OP): Performed by: NURSE PRACTITIONER

## 2022-03-12 PROCEDURE — 36415 COLL VENOUS BLD VENIPUNCTURE: CPT

## 2022-03-12 PROCEDURE — 99232 SBSQ HOSP IP/OBS MODERATE 35: CPT | Mod: FS

## 2022-03-12 PROCEDURE — 85025 COMPLETE CBC W/AUTO DIFF WBC: CPT

## 2022-03-12 PROCEDURE — 700102 HCHG RX REV CODE 250 W/ 637 OVERRIDE(OP): Performed by: SURGERY

## 2022-03-12 PROCEDURE — 93005 ELECTROCARDIOGRAM TRACING: CPT

## 2022-03-12 PROCEDURE — 71045 X-RAY EXAM CHEST 1 VIEW: CPT

## 2022-03-12 PROCEDURE — A9270 NON-COVERED ITEM OR SERVICE: HCPCS | Performed by: SURGERY

## 2022-03-12 PROCEDURE — 700111 HCHG RX REV CODE 636 W/ 250 OVERRIDE (IP): Performed by: SURGERY

## 2022-03-12 PROCEDURE — 93010 ELECTROCARDIOGRAM REPORT: CPT | Performed by: INTERNAL MEDICINE

## 2022-03-12 PROCEDURE — 82550 ASSAY OF CK (CPK): CPT | Mod: 91

## 2022-03-12 PROCEDURE — A9270 NON-COVERED ITEM OR SERVICE: HCPCS | Performed by: NURSE PRACTITIONER

## 2022-03-12 PROCEDURE — 770001 HCHG ROOM/CARE - MED/SURG/GYN PRIV*

## 2022-03-12 RX ORDER — BUTALBITAL, ACETAMINOPHEN AND CAFFEINE 50; 325; 40 MG/1; MG/1; MG/1
1-2 TABLET ORAL EVERY 6 HOURS PRN
Status: DISCONTINUED | OUTPATIENT
Start: 2022-03-12 | End: 2022-03-13 | Stop reason: HOSPADM

## 2022-03-12 RX ORDER — METAXALONE 800 MG/1
800 TABLET ORAL 3 TIMES DAILY PRN
Status: DISCONTINUED | OUTPATIENT
Start: 2022-03-12 | End: 2022-03-13 | Stop reason: HOSPADM

## 2022-03-12 RX ADMIN — CELECOXIB 200 MG: 200 CAPSULE ORAL at 17:23

## 2022-03-12 RX ADMIN — POLYETHYLENE GLYCOL 3350 1 PACKET: 17 POWDER, FOR SOLUTION ORAL at 17:23

## 2022-03-12 RX ADMIN — OXYCODONE 5 MG: 5 TABLET ORAL at 08:02

## 2022-03-12 RX ADMIN — OXYCODONE 5 MG: 5 TABLET ORAL at 11:31

## 2022-03-12 RX ADMIN — ACETAMINOPHEN 650 MG: 325 TABLET, FILM COATED ORAL at 11:31

## 2022-03-12 RX ADMIN — ACETAMINOPHEN 650 MG: 325 TABLET, FILM COATED ORAL at 04:23

## 2022-03-12 RX ADMIN — OXYCODONE 5 MG: 5 TABLET ORAL at 15:16

## 2022-03-12 RX ADMIN — DOCUSATE SODIUM 100 MG: 100 CAPSULE ORAL at 17:22

## 2022-03-12 RX ADMIN — SODIUM CHLORIDE, POTASSIUM CHLORIDE, SODIUM LACTATE AND CALCIUM CHLORIDE: 600; 310; 30; 20 INJECTION, SOLUTION INTRAVENOUS at 10:22

## 2022-03-12 RX ADMIN — ACETAMINOPHEN 650 MG: 325 TABLET, FILM COATED ORAL at 17:21

## 2022-03-12 RX ADMIN — OXYCODONE 5 MG: 5 TABLET ORAL at 20:22

## 2022-03-12 RX ADMIN — CELECOXIB 200 MG: 200 CAPSULE ORAL at 04:23

## 2022-03-12 RX ADMIN — ENOXAPARIN SODIUM 30 MG: 30 INJECTION SUBCUTANEOUS at 17:23

## 2022-03-12 RX ADMIN — BUTALBITAL, ACETAMINOPHEN, AND CAFFEINE 1 TABLET: 50; 325; 40 TABLET ORAL at 15:13

## 2022-03-12 RX ADMIN — ACETAMINOPHEN 650 MG: 325 TABLET, FILM COATED ORAL at 23:40

## 2022-03-12 RX ADMIN — DOCUSATE SODIUM 100 MG: 100 CAPSULE ORAL at 04:23

## 2022-03-12 ASSESSMENT — ENCOUNTER SYMPTOMS
HEADACHES: 1
EYES NEGATIVE: 1
CONSTIPATION: 0
NAUSEA: 0
ROS GI COMMENTS: LAST BM PTA
CHILLS: 0
PALPITATIONS: 0
MYALGIAS: 1
CARDIOVASCULAR NEGATIVE: 1
WEAKNESS: 1
SHORTNESS OF BREATH: 0
VOMITING: 0
TREMORS: 1
SENSORY CHANGE: 1
DOUBLE VISION: 0
BLURRED VISION: 0
FEVER: 0
COUGH: 0
ABDOMINAL PAIN: 0
TINGLING: 1
PSYCHIATRIC NEGATIVE: 1

## 2022-03-12 ASSESSMENT — PAIN DESCRIPTION - PAIN TYPE
TYPE: ACUTE PAIN
TYPE: ACUTE PAIN
TYPE: ACUTE PAIN;SURGICAL PAIN

## 2022-03-12 ASSESSMENT — COPD QUESTIONNAIRES
DO YOU EVER COUGH UP ANY MUCUS OR PHLEGM?: NO/ONLY WITH OCCASIONAL COLDS OR INFECTIONS
COPD SCREENING SCORE: 0
HAVE YOU SMOKED AT LEAST 100 CIGARETTES IN YOUR ENTIRE LIFE: NO/DON'T KNOW
DURING THE PAST 4 WEEKS HOW MUCH DID YOU FEEL SHORT OF BREATH: NONE/LITTLE OF THE TIME

## 2022-03-12 ASSESSMENT — PATIENT HEALTH QUESTIONNAIRE - PHQ9
2. FEELING DOWN, DEPRESSED, IRRITABLE, OR HOPELESS: NOT AT ALL
1. LITTLE INTEREST OR PLEASURE IN DOING THINGS: NOT AT ALL
SUM OF ALL RESPONSES TO PHQ9 QUESTIONS 1 AND 2: 0

## 2022-03-12 NOTE — ED NOTES
Report to sabas WEINSTEIN all quesitons answered no other needs at this time pt updated on plan of care understands admission
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
## 2022-03-12 NOTE — CARE PLAN
The patient is Stable - Low risk of patient condition declining or worsening    Shift Goals  Clinical Goals: pain management, trend neuros/labs  Patient Goals: manage pain  Family Goals: rest    Progress made toward(s) clinical / shift goals:    Problem: Knowledge Deficit - Standard  Goal: Patient and family/care givers will demonstrate understanding of plan of care, disease process/condition, diagnostic tests and medications  Outcome: Progressing     Problem: Pain - Standard  Goal: Alleviation of pain or a reduction in pain to the patient’s comfort goal  Outcome: Progressing       Pt updated on plan of care. Pt encouraged to ask questions and questions were answered. Call light within reach. Pt reminded to use call light whenever needing assistance.   Pt medicated for pain per MAR, will continue to assess pain and perform neuro checks and update care team with any changes.

## 2022-03-12 NOTE — CARE PLAN
The patient is Stable - Low risk of patient condition declining or worsening    Shift Goals  Clinical Goals: Pain management  Patient Goals: Pain management  Family Goals: Family not present    Progress made toward(s) clinical / shift goals:     Problem: Knowledge Deficit - Standard  Goal: Patient and family/care givers will demonstrate understanding of plan of care, disease process/condition, diagnostic tests and medications  3/12/2022 1405 by Vannessa Ziegler, R.N.  Note: Pt educated on POC, monitor VS/labs, pain control, mobility, safety, all questions answered, hourly rounding in progress.    Problem: Pain - Standard  Goal: Alleviation of pain or a reduction in pain to the patient’s comfort goal  3/12/2022 1405 by Vannessa Ziegler, R.N.  Note: VSS, patient is medicated per pain scale, hourly rounding/neuro checks in progress.

## 2022-03-12 NOTE — PROGRESS NOTES
Report received. Assumed care. Pt in bed awake. A/O x4. VSS. Responds appropriately. C/O pain, medicated per MAR, no SOB. Assessment complete. Discussed POC,  pt verbalizes understanding. Explained importance of calling before getting OOB. Call light and belongings within reach. Bed alarm on. Bed in the lowest position. Treaded socks in place. Hourly rounding in progress. Will continue to monitor .

## 2022-03-12 NOTE — PROGRESS NOTES
Assumed care of patient at bedside report from day RN. Updated on POC. Patient currently A & O x 4; on room air; up with assistance of at least two people with complaints of acute pain. Assessment completed at this time.  Call light within reach. Whiteboard updated. Fall precautions in place. Bed locked and in lowest position. All questions answered. No other needs indicated at this time.

## 2022-03-12 NOTE — PROGRESS NOTES
4 Eyes Skin Assessment Completed by Destiny WEINSTEIN RN and JS Hurd.    Head WDL  Ears WDL  Nose WDL  Mouth WDL  Neck WDL  Breast/Chest WDL  Shoulder Blades WDL  Spine WDL  (R) Arm/Elbow/Hand Bruising and Abrasion  (L) Arm/Elbow/Hand WDL  Abdomen WDL  Groin WDL  Scrotum/Coccyx/Buttocks WDL  (R) Leg WDL  (L) Leg WDL  (R) Heel/Foot/Toe WDL  (L) Heel/Foot/Toe WDL          Devices In Places Blood Pressure Cuff      Interventions In Place Pressure Redistribution Mattress    Possible Skin Injury No    Pictures Uploaded Into Epic N/A  Wound Consult Placed N/A  RN Wound Prevention Protocol Ordered No

## 2022-03-13 ENCOUNTER — APPOINTMENT (OUTPATIENT)
Dept: RADIOLOGY | Facility: MEDICAL CENTER | Age: 42
DRG: 923 | End: 2022-03-13
Attending: SURGERY
Payer: COMMERCIAL

## 2022-03-13 ENCOUNTER — PHARMACY VISIT (OUTPATIENT)
Dept: PHARMACY | Facility: MEDICAL CENTER | Age: 42
End: 2022-03-13
Payer: COMMERCIAL

## 2022-03-13 VITALS
WEIGHT: 315 LBS | SYSTOLIC BLOOD PRESSURE: 121 MMHG | HEIGHT: 75 IN | DIASTOLIC BLOOD PRESSURE: 74 MMHG | RESPIRATION RATE: 18 BRPM | BODY MASS INDEX: 39.17 KG/M2 | OXYGEN SATURATION: 93 % | TEMPERATURE: 99.2 F | HEART RATE: 51 BPM

## 2022-03-13 LAB
ANION GAP SERPL CALC-SCNC: 10 MMOL/L (ref 7–16)
BASOPHILS # BLD AUTO: 0.5 % (ref 0–1.8)
BASOPHILS # BLD: 0.03 K/UL (ref 0–0.12)
BUN SERPL-MCNC: 17 MG/DL (ref 8–22)
CALCIUM SERPL-MCNC: 8.6 MG/DL (ref 8.5–10.5)
CHLORIDE SERPL-SCNC: 107 MMOL/L (ref 96–112)
CO2 SERPL-SCNC: 22 MMOL/L (ref 20–33)
CREAT SERPL-MCNC: 0.74 MG/DL (ref 0.5–1.4)
EKG IMPRESSION: NORMAL
EOSINOPHIL # BLD AUTO: 0.21 K/UL (ref 0–0.51)
EOSINOPHIL NFR BLD: 3.7 % (ref 0–6.9)
ERYTHROCYTE [DISTWIDTH] IN BLOOD BY AUTOMATED COUNT: 40.8 FL (ref 35.9–50)
GLUCOSE SERPL-MCNC: 104 MG/DL (ref 65–99)
HCT VFR BLD AUTO: 39.4 % (ref 42–52)
HGB BLD-MCNC: 13.6 G/DL (ref 14–18)
IMM GRANULOCYTES # BLD AUTO: 0.02 K/UL (ref 0–0.11)
IMM GRANULOCYTES NFR BLD AUTO: 0.3 % (ref 0–0.9)
LYMPHOCYTES # BLD AUTO: 2.78 K/UL (ref 1–4.8)
LYMPHOCYTES NFR BLD: 48.3 % (ref 22–41)
MAGNESIUM SERPL-MCNC: 2.1 MG/DL (ref 1.5–2.5)
MCH RBC QN AUTO: 31.6 PG (ref 27–33)
MCHC RBC AUTO-ENTMCNC: 34.5 G/DL (ref 33.7–35.3)
MCV RBC AUTO: 91.6 FL (ref 81.4–97.8)
MONOCYTES # BLD AUTO: 0.4 K/UL (ref 0–0.85)
MONOCYTES NFR BLD AUTO: 7 % (ref 0–13.4)
NEUTROPHILS # BLD AUTO: 2.31 K/UL (ref 1.82–7.42)
NEUTROPHILS NFR BLD: 40.2 % (ref 44–72)
NRBC # BLD AUTO: 0 K/UL
NRBC BLD-RTO: 0 /100 WBC
PLATELET # BLD AUTO: 141 K/UL (ref 164–446)
PMV BLD AUTO: 9.8 FL (ref 9–12.9)
POTASSIUM SERPL-SCNC: 4 MMOL/L (ref 3.6–5.5)
RBC # BLD AUTO: 4.3 M/UL (ref 4.7–6.1)
SODIUM SERPL-SCNC: 139 MMOL/L (ref 135–145)
WBC # BLD AUTO: 5.8 K/UL (ref 4.8–10.8)

## 2022-03-13 PROCEDURE — 700102 HCHG RX REV CODE 250 W/ 637 OVERRIDE(OP): Performed by: NURSE PRACTITIONER

## 2022-03-13 PROCEDURE — 71045 X-RAY EXAM CHEST 1 VIEW: CPT

## 2022-03-13 PROCEDURE — A9270 NON-COVERED ITEM OR SERVICE: HCPCS | Performed by: NURSE PRACTITIONER

## 2022-03-13 PROCEDURE — 85025 COMPLETE CBC W/AUTO DIFF WBC: CPT

## 2022-03-13 PROCEDURE — 36415 COLL VENOUS BLD VENIPUNCTURE: CPT

## 2022-03-13 PROCEDURE — 700111 HCHG RX REV CODE 636 W/ 250 OVERRIDE (IP): Performed by: SURGERY

## 2022-03-13 PROCEDURE — 93010 ELECTROCARDIOGRAM REPORT: CPT | Performed by: INTERNAL MEDICINE

## 2022-03-13 PROCEDURE — 80048 BASIC METABOLIC PNL TOTAL CA: CPT

## 2022-03-13 PROCEDURE — 99239 HOSP IP/OBS DSCHRG MGMT >30: CPT

## 2022-03-13 PROCEDURE — 83735 ASSAY OF MAGNESIUM: CPT

## 2022-03-13 PROCEDURE — RXMED WILLOW AMBULATORY MEDICATION CHARGE

## 2022-03-13 PROCEDURE — 97161 PT EVAL LOW COMPLEX 20 MIN: CPT

## 2022-03-13 PROCEDURE — 93005 ELECTROCARDIOGRAM TRACING: CPT

## 2022-03-13 PROCEDURE — A9270 NON-COVERED ITEM OR SERVICE: HCPCS | Performed by: SURGERY

## 2022-03-13 PROCEDURE — 700102 HCHG RX REV CODE 250 W/ 637 OVERRIDE(OP): Performed by: SURGERY

## 2022-03-13 RX ORDER — OXYCODONE HYDROCHLORIDE 5 MG/1
2.5-5 TABLET ORAL
Qty: 30 TABLET | Refills: 0 | Status: SHIPPED | OUTPATIENT
Start: 2022-03-13 | End: 2022-03-20

## 2022-03-13 RX ORDER — METAXALONE 800 MG/1
800 TABLET ORAL 3 TIMES DAILY PRN
Qty: 90 TABLET | Refills: 0 | Status: SHIPPED | OUTPATIENT
Start: 2022-03-13 | End: 2022-03-27

## 2022-03-13 RX ORDER — BUTALBITAL, ACETAMINOPHEN AND CAFFEINE 50; 325; 40 MG/1; MG/1; MG/1
1-2 TABLET ORAL EVERY 6 HOURS PRN
Qty: 40 TABLET | Refills: 0 | Status: SHIPPED | OUTPATIENT
Start: 2022-03-13 | End: 2022-03-23

## 2022-03-13 RX ORDER — ACETAMINOPHEN 325 MG/1
650 TABLET ORAL EVERY 6 HOURS
Qty: 30 TABLET | Refills: 0 | COMMUNITY
Start: 2022-03-13

## 2022-03-13 RX ORDER — PSEUDOEPHEDRINE HCL 30 MG
100 TABLET ORAL 2 TIMES DAILY
Qty: 60 CAPSULE | COMMUNITY
Start: 2022-03-13 | End: 2022-04-21

## 2022-03-13 RX ADMIN — CELECOXIB 200 MG: 200 CAPSULE ORAL at 05:20

## 2022-03-13 RX ADMIN — OXYCODONE 5 MG: 5 TABLET ORAL at 03:19

## 2022-03-13 RX ADMIN — ACETAMINOPHEN 650 MG: 325 TABLET, FILM COATED ORAL at 05:20

## 2022-03-13 RX ADMIN — ENOXAPARIN SODIUM 30 MG: 30 INJECTION SUBCUTANEOUS at 05:20

## 2022-03-13 RX ADMIN — ACETAMINOPHEN 650 MG: 325 TABLET, FILM COATED ORAL at 12:31

## 2022-03-13 RX ADMIN — OXYCODONE 5 MG: 5 TABLET ORAL at 09:23

## 2022-03-13 RX ADMIN — BUTALBITAL, ACETAMINOPHEN, AND CAFFEINE 1 TABLET: 50; 325; 40 TABLET ORAL at 07:53

## 2022-03-13 ASSESSMENT — GAIT ASSESSMENTS
DISTANCE (FEET): 150
DEVIATION: NO DEVIATION
GAIT LEVEL OF ASSIST: SUPERVISED

## 2022-03-13 ASSESSMENT — PAIN DESCRIPTION - PAIN TYPE
TYPE: ACUTE PAIN
TYPE: ACUTE PAIN

## 2022-03-13 ASSESSMENT — COGNITIVE AND FUNCTIONAL STATUS - GENERAL
MOBILITY SCORE: 20
SUGGESTED CMS G CODE MODIFIER MOBILITY: CJ
MOVING TO AND FROM BED TO CHAIR: A LITTLE
WALKING IN HOSPITAL ROOM: A LITTLE
STANDING UP FROM CHAIR USING ARMS: A LITTLE
CLIMB 3 TO 5 STEPS WITH RAILING: A LITTLE

## 2022-03-13 ASSESSMENT — PATIENT HEALTH QUESTIONNAIRE - PHQ9
SUM OF ALL RESPONSES TO PHQ9 QUESTIONS 1 AND 2: 0
2. FEELING DOWN, DEPRESSED, IRRITABLE, OR HOPELESS: NOT AT ALL
1. LITTLE INTEREST OR PLEASURE IN DOING THINGS: NOT AT ALL

## 2022-03-13 NOTE — CARE PLAN
The patient is Stable - Low risk of patient condition declining or worsening    Shift Goals  Clinical Goals: Pain mgmt  Patient Goals: Pain mgmt  Family Goals: Family not present    Progress made toward(s) clinical / shift goals:  Pain well managed with PO meds      Problem: Knowledge Deficit - Standard  Goal: Patient and family/care givers will demonstrate understanding of plan of care, disease process/condition, diagnostic tests and medications  Outcome: Progressing     Problem: Pain - Standard  Goal: Alleviation of pain or a reduction in pain to the patient’s comfort goal  Outcome: Progressing       Patient is not progressing towards the following goals:
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
## 2022-03-13 NOTE — PROGRESS NOTES
Monitor Strip Summary   Remote monitoring initiated 1348    SR: 63-72  Rare PVC, Rare PAC  .21/.06/.31     Per monitor strip summary report

## 2022-03-13 NOTE — PROGRESS NOTES
Patient sitting up in bed, calm, no distress noted. AOx4. See flowsheets for q4h neuro checks. Reports 7/10 R finger pain and generalized aches. Medicated per MAR. EDUARDO Walker. Telebox in place with patient in SR. L PIV dressing changed. Patient verbalized understanding to call for assistance prior to toileting. Call light within reach. Bed alarm not indicated.

## 2022-03-13 NOTE — PROGRESS NOTES
Notified Bernarda COLBY of pt's HR lowest 35, pt is sleeping at the time, denies any pain or discomfort. LASHA ordered stat EKG. Will continue to monitor.

## 2022-03-13 NOTE — PROGRESS NOTES
Report received from night RN at bedside, assumed care of pt  Pt A&Ox4, on room air, responding appropriately  Pt complains of pain, medicated per MAR, no SOB  Discussed updated POC, pt verbalizes understanding  Call light and belongings within reach, pt educated on importance of calling for assistance before getting OOB.  Bed alarm on, bed locked and in lowest position, SCDs in place.  Fall precautions in place.  Hourly rounding in place, will continue to monitor.   All questions answered, no other needs indicated at this time.

## 2022-03-13 NOTE — PROGRESS NOTES
Report received from night RN at bedside, assumed care of pt.  Pt A&Ox4, on room air, responding appropriately.  Pt complains of pain, medicated per MAR, no SOB.  Discussed updated POC, pt verbalizes understanding.   Call light and belongings within reach, pt educated on importance of calling for assistance before getting OOB.  Bed alarm on, bed locked/in lowest position, SCDs in place.  Fall precautions in place.  Hourly rounding in place, will continue to monitor.  All questions answered, no other needs indicated at this time.

## 2022-03-13 NOTE — DISCHARGE INSTRUCTIONS
Discharge Instructions    Discharged to home by car with relative. Discharged via wheelchair, hospital escort: Yes.  Special equipment needed: Not Applicable    Be sure to schedule a follow-up appointment with your primary care doctor or any specialists as instructed.     Discharge Plan:   Influenza Vaccine Indication: Patient Refuses    I understand that a diet low in cholesterol, fat, and sodium is recommended for good health. Unless I have been given specific instructions below for another diet, I accept this instruction as my diet prescription.   Other diet: Regular diet    Special Instructions: None    · Is patient discharged on Warfarin / Coumadin?   No       Electric Shock Injury  An electric shock can happen when a person comes in contact with a source of electricity. When electricity passes through the body (electric shock), it can damage the skin and internal organs. A strong (high voltage)electric shock can harm the heart, muscles, and brain.  The severity of an electric shock injury depends on several factors, such as the voltage, the type of current, and the amount of time the person was in contact with the electricity. Most electric shock injuries that cause serious damage to the body are from a shock that is greater than 600 volts. However, just 50 volts of electricity may be enough to disrupt the heart's rhythm.  What are the causes?  Common causes of this condition include:  · Contact with electricity from wires or appliances in the home. Household electricity usually ranges from 110-240 volts of alternating current.  · Children chewing and biting electric cords or playing with electric outlets.  · Getting hit by lightning.  · Workplace injury.  · Injury from a high-voltage power line. A high-tension wire may be 100,000 volts or more.  What are the signs or symptoms?  Symptoms of this condition include:  · Tingling and numbness.  · Very bad pain.  · Muscle spasms.  · Skin burns (thermal  burns).  · Broken bones.  · Head injury (trauma).  · Chest pain.  · Very fast or irregular heartbeat (palpitations).  · Heart attack.  · Trouble breathing, hearing, seeing, or swallowing.  · Headache.  · Confusion.  · Loss of memory.  · Jerky movements that you cannot control (seizure).  · Passing out (losing consciousness).  How is this diagnosed?  This condition is diagnosed based on:  · Your symptoms.  · Your history of receiving a shock.  · A physical exam.  · Tests to determine how badly you have been injured. You may have:  ? Blood tests to check:  § Your blood cell counts (CBC).  § Minerals in your blood (electrolyte panel).  § For muscle or kidney damage.  § The oxygen level of your blood.  ? Electrocardiogram (ECG) to evaluate heart function.  ? Urine tests to check for muscle enzymes. This would show damage to the muscles.  ? Imaging studies, including:  § X-rays of your chest or spine or both.  § Ultrasound.  § CT scan.  How is this treated?  Treatment for electric shock injuries depends on the type of injury you have. Emergency treatment may include:  · IV fluids and medicines to support blood pressure.  · Oxygen and breathing support, if necessary.  · Treatment for burns, broken bones, or head injuries.  · Keeping the neck and spine from moving if there are signs of a broken bone in the spine (fracture).  · A long-term treatment plan, which may include surgery to treat broken bones or severe burns.  Follow these instructions at home:  · Take over-the-counter and prescription medicines only as told by your health care provider.  · Do not drive or use heavy machinery while taking prescription pain medicine.  · Follow instructions from your health care provider about how to take care of your wounds. Make sure you:  ? Wash your hands with soap and water before you change your bandage (dressing). If soap and water are not available, use hand .  ? Change your dressing as told by your health care  provider.  · Keep all follow-up visits as told by your health care provider. This is important.  How is this prevented?         To prevent electric shock injuries in the future:  · Follow the 's instructions and precautions when using an electric appliance.  · Make sure the power is off before you work on electrical appliances.  · Always wear protective gear such as rubber gloves when you work with electrical wires.  · Do not touch wet surfaces, faucets, or water pipes while using an electric appliance.  · Keep electrical appliances away from the tub or shower.  · Keep electric cords out of the reach of children. Use safety plugs in electric outlets.  · During a thunderstorm, take safety precautions. Lightning can travel through water, electrical systems, metal wires, and plumbing.  ? If you are inside, do not use electrical equipment such as corded phones and computers. Stay away from windows, doors, and plumbing.  ? If you are outdoors, find shelter right away in a building or a closed vehicle. Do not stand under a tree.  Contact a health care provider if:  · You develop new symptoms.  · Your symptoms change or get worse.  Get help right away if:  · You have a seizure.  · You have chest pain.  · You have trouble breathing.  These symptoms may represent a serious problem that is an emergency. Do not wait to see if the symptoms will go away. Get medical help right away. Call your local emergency services (911 in the [place] Do not drive yourself to the hospital.  Summary  · When electricity passes through your body, it can damage your skin and internal organs.  · You may have a variety of tests to determine how badly you have been injured.  · Treatment depends on the type of injury you have. You may need emergency treatment if you have been badly injured.  This information is not intended to replace advice given to you by your health care provider. Make sure you discuss any questions you have with your  health care provider.  Document Released: 12/20/2004 Document Revised: 04/09/2020 Document Reviewed: 04/01/2019  Elsevier Patient Education © 2020 Elsevier Inc[employer]    Depression / Suicide Risk    As you are discharged from this [facility] facility, it is important to learn how to keep safe from harming yourself.    Recognize the warning signs:  · Abrupt changes in personality, positive or negative- including increase in energy   · Giving away possessions  · Change in eating patterns- significant weight changes-  positive or negative  · Change in sleeping patterns- unable to sleep or sleeping all the time   · Unwillingness or inability to communicate  · Depression  · Unusual sadness, discouragement and loneliness  · Talk of wanting to die  · Neglect of personal appearance   · Rebelliousness- reckless behavior  · Withdrawal from people/activities they love  · Confusion- inability to concentrate     If you or a loved one observes any of these behaviors or has concerns about self-harm, here's what you can do:  · Talk about it- your feelings and reasons for harming yourself  · Remove any means that you might use to hurt yourself (examples: pills, rope, extension cords, firearm)  · Get professional help from the community (Mental Health, Substance Abuse, psychological counseling)  · Do not be alone:Call your Safe Contact- someone whom you trust who will be there for you.  · Call your local CRISIS HOTLINE 150-6968 or 385-237-8126  · Call your local Children's Mobile Crisis Response Team [place] (176) 608-2695 or www.LogRhythm  · Call the toll free National Suicide Prevention Hotlines   · National Suicide Prevention Lifeline 662-138-SRPB (6631)  · National Hope Line Network 800-SUICIDE (614-9392)            - Call or seek medical attention for questions or concerns  - Follow up with Dr. Mac as needed or if symptoms worsen  - Follow up with [employer] - Workers comp as needed or if symptoms worsen  - Follow  up with primary care provider within one weeks time or if symptoms worsen  - Resume regular diet  - May take over the counter acetaminophen or ibuprofen as needed for pain  - Continue daily over the counter stool softener while on narcotics  - No operation of machinery or motorized vehicles while under the influence of narcotics  - No alcohol, marijuana or illicit drug use while under the influence of narcotics  - In the event of a narcotic overdose naloxone (Narcan) is available without a prescription from any [facility] or [facility]  - No swimming, hot tubs, baths or wound submersion until cleared by outpatient provider. May shower  - No contact sports, strenuous activities, or heavy lifting until cleared by outpatient provider  - If respiratory decompensation, persistent or worsening pain, neurological decompensation, or signs or symptoms of infection occur seek medical attention
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
## 2022-03-13 NOTE — PROGRESS NOTES
D/C'd.  Discharge instructions provided to pt.  Pt states understanding.  Pt states all questions have been answered.  Copy of discharge provided to pt.  Signed copy in chart.  Prescriptions provided to pt per meds to beds. Pt states that all personal belongings are in possession.   Pt escorted off unit with assistance from this RN via wheelchair without incident.
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
## 2022-04-20 ENCOUNTER — TELEPHONE (OUTPATIENT)
Dept: SCHEDULING | Facility: IMAGING CENTER | Age: 42
End: 2022-04-20

## 2022-04-21 ENCOUNTER — OFFICE VISIT (OUTPATIENT)
Dept: MEDICAL GROUP | Facility: PHYSICIAN GROUP | Age: 42
End: 2022-04-21
Payer: COMMERCIAL

## 2022-04-21 VITALS
OXYGEN SATURATION: 98 % | TEMPERATURE: 98.7 F | DIASTOLIC BLOOD PRESSURE: 70 MMHG | RESPIRATION RATE: 18 BRPM | SYSTOLIC BLOOD PRESSURE: 132 MMHG | HEIGHT: 74 IN | HEART RATE: 75 BPM | WEIGHT: 315 LBS | BODY MASS INDEX: 40.43 KG/M2

## 2022-04-21 DIAGNOSIS — Z00.00 HEALTHCARE MAINTENANCE: ICD-10-CM

## 2022-04-21 DIAGNOSIS — J30.2 SEASONAL ALLERGIES: ICD-10-CM

## 2022-04-21 DIAGNOSIS — R14.0 ABDOMINAL BLOATING: ICD-10-CM

## 2022-04-21 PROCEDURE — 99214 OFFICE O/P EST MOD 30 MIN: CPT | Performed by: STUDENT IN AN ORGANIZED HEALTH CARE EDUCATION/TRAINING PROGRAM

## 2022-04-21 SDOH — ECONOMIC STABILITY: TRANSPORTATION INSECURITY
IN THE PAST 12 MONTHS, HAS LACK OF RELIABLE TRANSPORTATION KEPT YOU FROM MEDICAL APPOINTMENTS, MEETINGS, WORK OR FROM GETTING THINGS NEEDED FOR DAILY LIVING?: NO

## 2022-04-21 SDOH — ECONOMIC STABILITY: INCOME INSECURITY: IN THE LAST 12 MONTHS, WAS THERE A TIME WHEN YOU WERE NOT ABLE TO PAY THE MORTGAGE OR RENT ON TIME?: NO

## 2022-04-21 SDOH — HEALTH STABILITY: PHYSICAL HEALTH: ON AVERAGE, HOW MANY DAYS PER WEEK DO YOU ENGAGE IN MODERATE TO STRENUOUS EXERCISE (LIKE A BRISK WALK)?: 5 DAYS

## 2022-04-21 SDOH — ECONOMIC STABILITY: FOOD INSECURITY: WITHIN THE PAST 12 MONTHS, THE FOOD YOU BOUGHT JUST DIDN'T LAST AND YOU DIDN'T HAVE MONEY TO GET MORE.: PATIENT DECLINED

## 2022-04-21 SDOH — ECONOMIC STABILITY: FOOD INSECURITY: WITHIN THE PAST 12 MONTHS, YOU WORRIED THAT YOUR FOOD WOULD RUN OUT BEFORE YOU GOT MONEY TO BUY MORE.: NEVER TRUE

## 2022-04-21 SDOH — ECONOMIC STABILITY: HOUSING INSECURITY
IN THE LAST 12 MONTHS, WAS THERE A TIME WHEN YOU DID NOT HAVE A STEADY PLACE TO SLEEP OR SLEPT IN A SHELTER (INCLUDING NOW)?: NO

## 2022-04-21 SDOH — HEALTH STABILITY: PHYSICAL HEALTH: ON AVERAGE, HOW MANY MINUTES DO YOU ENGAGE IN EXERCISE AT THIS LEVEL?: 30 MIN

## 2022-04-21 SDOH — ECONOMIC STABILITY: INCOME INSECURITY: HOW HARD IS IT FOR YOU TO PAY FOR THE VERY BASICS LIKE FOOD, HOUSING, MEDICAL CARE, AND HEATING?: NOT HARD AT ALL

## 2022-04-21 SDOH — ECONOMIC STABILITY: TRANSPORTATION INSECURITY
IN THE PAST 12 MONTHS, HAS THE LACK OF TRANSPORTATION KEPT YOU FROM MEDICAL APPOINTMENTS OR FROM GETTING MEDICATIONS?: NO

## 2022-04-21 SDOH — ECONOMIC STABILITY: HOUSING INSECURITY

## 2022-04-21 SDOH — HEALTH STABILITY: MENTAL HEALTH
STRESS IS WHEN SOMEONE FEELS TENSE, NERVOUS, ANXIOUS, OR CAN'T SLEEP AT NIGHT BECAUSE THEIR MIND IS TROUBLED. HOW STRESSED ARE YOU?: ONLY A LITTLE

## 2022-04-21 SDOH — ECONOMIC STABILITY: TRANSPORTATION INSECURITY
IN THE PAST 12 MONTHS, HAS LACK OF TRANSPORTATION KEPT YOU FROM MEETINGS, WORK, OR FROM GETTING THINGS NEEDED FOR DAILY LIVING?: NO

## 2022-04-21 ASSESSMENT — SOCIAL DETERMINANTS OF HEALTH (SDOH)
HOW OFTEN DO YOU ATTEND CHURCH OR RELIGIOUS SERVICES?: PATIENT DECLINED
HOW OFTEN DO YOU ATTENT MEETINGS OF THE CLUB OR ORGANIZATION YOU BELONG TO?: PATIENT DECLINED
DO YOU BELONG TO ANY CLUBS OR ORGANIZATIONS SUCH AS CHURCH GROUPS UNIONS, FRATERNAL OR ATHLETIC GROUPS, OR SCHOOL GROUPS?: NO
HOW OFTEN DO YOU GET TOGETHER WITH FRIENDS OR RELATIVES?: TWICE A WEEK
WITHIN THE PAST 12 MONTHS, YOU WORRIED THAT YOUR FOOD WOULD RUN OUT BEFORE YOU GOT THE MONEY TO BUY MORE: NEVER TRUE
HOW OFTEN DO YOU ATTEND CHURCH OR RELIGIOUS SERVICES?: PATIENT DECLINED
HOW OFTEN DO YOU HAVE A DRINK CONTAINING ALCOHOL: 2-3 TIMES A WEEK
HOW OFTEN DO YOU ATTENT MEETINGS OF THE CLUB OR ORGANIZATION YOU BELONG TO?: PATIENT DECLINED
IN A TYPICAL WEEK, HOW MANY TIMES DO YOU TALK ON THE PHONE WITH FAMILY, FRIENDS, OR NEIGHBORS?: MORE THAN THREE TIMES A WEEK
HOW OFTEN DO YOU GET TOGETHER WITH FRIENDS OR RELATIVES?: TWICE A WEEK
DO YOU BELONG TO ANY CLUBS OR ORGANIZATIONS SUCH AS CHURCH GROUPS UNIONS, FRATERNAL OR ATHLETIC GROUPS, OR SCHOOL GROUPS?: NO
HOW OFTEN DO YOU HAVE SIX OR MORE DRINKS ON ONE OCCASION: NEVER
IN A TYPICAL WEEK, HOW MANY TIMES DO YOU TALK ON THE PHONE WITH FAMILY, FRIENDS, OR NEIGHBORS?: MORE THAN THREE TIMES A WEEK
HOW HARD IS IT FOR YOU TO PAY FOR THE VERY BASICS LIKE FOOD, HOUSING, MEDICAL CARE, AND HEATING?: NOT HARD AT ALL
HOW MANY DRINKS CONTAINING ALCOHOL DO YOU HAVE ON A TYPICAL DAY WHEN YOU ARE DRINKING: 1 OR 2

## 2022-04-21 ASSESSMENT — LIFESTYLE VARIABLES
HOW MANY STANDARD DRINKS CONTAINING ALCOHOL DO YOU HAVE ON A TYPICAL DAY: 1 OR 2
HOW OFTEN DO YOU HAVE A DRINK CONTAINING ALCOHOL: 2-3 TIMES A WEEK
HOW OFTEN DO YOU HAVE SIX OR MORE DRINKS ON ONE OCCASION: NEVER

## 2022-04-21 ASSESSMENT — FIBROSIS 4 INDEX: FIB4 SCORE: 1.04

## 2022-04-21 NOTE — PROGRESS NOTES
Subjective:     CC:  Diagnoses of Seasonal allergies and Abdominal bloating were pertinent to this visit.    HISTORY OF THE PRESENT ILLNESS: Patient is a 41 y.o. male. This pleasant patient is here today to establish care.     1.Seasonal allergies.  Patient reports a lifetime of seasonal allergies.  Symptoms include itchy eyes, itchy throat, nasal congestion.  This starts every spring and works its way through the summer.  He has used over-the-counter antihistamines and found this to provide incomplete relief.    2. Abdominal Bloating.  Patient has a near lifetime history of abdominal bloating with gas.  He suspects it may be due to either dairy or alcohol consumption.  He has not tried to cut either out to see which one might be the cause.    Active Diagnosis:    Patient Active Problem List   Diagnosis   • Cephalalgia   • Cerebral infarction (HCC)   • R Hemiparesthesia   • Dizziness   • History of concussion   • Dehydration   • Epistaxis   • Trauma   • Injury by electrocution, initial encounter   • No contraindication to deep vein thrombosis (DVT) prophylaxis   • Encounter for screening for COVID-19   • Seasonal allergies      Current Outpatient Medications Ordered in Epic   Medication Sig Dispense Refill   • acetaminophen (TYLENOL) 325 MG Tab Take 2 Tablets by mouth every 6 hours. 30 Tablet 0   • ibuprofen (MOTRIN) 200 MG Tab Take 600-800 mg by mouth every 6 hours as needed. Indications: Pain       No current Epic-ordered facility-administered medications on file.     ROS:   Gen: No fevers/chills, no changes in weight  HEENT: No changes in vision/hearing, sore throat.  Pulm: No cough, unexplained SOB.  CV: No chest pain/pressure, no palpitations  GI: No nausea/vomiting, no diarrhea  : No dysuria/nocturia.  See HPI.  MSk: No myalgias  Skin: No rash/skin changes  Neuro: No headaches, no numbness/tingling  Heme/Lymph: no easy bruising      Objective:     Exam: /70 (BP Location: Left arm, Patient Position:  "Sitting, BP Cuff Size: Large adult)   Pulse 75   Temp 37.1 °C (98.7 °F) (Temporal)   Resp 18   Ht 1.88 m (6' 2\")   Wt (!) 147 kg (323 lb 3.2 oz)   SpO2 98%  Body mass index is 41.5 kg/m².    General: Normal appearing. No distress.  HEENT: Normocephalic. Eyes conjunctiva clear lids without ptosis, pupils equal and reactive to light accommodation. Ears normal shape and contour, canals are clear bilaterally, tympanic membranes are benign, nasal mucosa benign, oropharynx is without erythema, edema or exudates.   Neck: Supple without JVD. Thyroid is not enlarged.  Pulmonary: Clear to ausculation.  Normal effort. No rales, ronchi, or wheezing.  Cardiovascular: Regular rate and rhythm without murmur. Radial pulses are intact and equal bilaterally.  Abdomen: Obese.  Neurologic: Grossly nonfocal.  CN II through XII intact.  Lymph: No cervical or supraclavicular lymph nodes are palpable  Skin: Warm and dry.  No obvious lesions.  Musculoskeletal: Normal gait. No extremity cyanosis, clubbing, or edema.  Psych: Normal mood and affect. Alert and oriented x3. Judgment and insight are normal.    A chaperone was offered to the patient during today's exam. Patient declined chaperone.    Labs:  3/13/2022:  -CBC shows diminished RBCs, H&H.  Platelets diminished at 141.      Assessment & Plan:   41 y.o. male with the following -    1.  Seasonal allergies  -Chronic, undertreated.  -Continue with daily over-the-counter antihistamine.  -Azelastine/fluticasone 137/50 intranasal twice daily.  Dispense 1 bottle.  Refill 5.    2.  Abdominal bloating  -Undiagnosed new problem.  -I have asked the patient to abstain from dairy products for 2 weeks to see if this alleviates his symptoms.  If it does then I have asked him to reintroduce dairy products while taking Lactaid.  After the dairy trial of asked the patient to abstain from alcohol for 2 weeks to see if this aids his symptoms.    3.  Health care maintenance  -  We discussed " diet/exercise/healthy weight maintenance.  We discussed the need to always wear seatbelt.  -CBC, CMP, lipid profile, hemoglobin A1c.    Return in about 1 year (around 4/21/2023).    Please note that this dictation was created using voice recognition software. I have made every reasonable attempt to correct obvious errors, but I expect that there are errors of grammar and possibly content that I did not discover before finalizing the note.      Ben Grullon PA-C 4/21/2022

## 2022-05-03 ENCOUNTER — HOSPITAL ENCOUNTER (OUTPATIENT)
Dept: LAB | Facility: MEDICAL CENTER | Age: 42
End: 2022-05-03
Attending: STUDENT IN AN ORGANIZED HEALTH CARE EDUCATION/TRAINING PROGRAM
Payer: COMMERCIAL

## 2022-05-03 DIAGNOSIS — Z00.00 HEALTHCARE MAINTENANCE: ICD-10-CM

## 2022-05-03 LAB
ALBUMIN SERPL BCP-MCNC: 4.3 G/DL (ref 3.2–4.9)
ALBUMIN/GLOB SERPL: 1.3 G/DL
ALP SERPL-CCNC: 119 U/L (ref 30–99)
ALT SERPL-CCNC: 30 U/L (ref 2–50)
ANION GAP SERPL CALC-SCNC: 11 MMOL/L (ref 7–16)
AST SERPL-CCNC: 20 U/L (ref 12–45)
BILIRUB SERPL-MCNC: 1.1 MG/DL (ref 0.1–1.5)
BUN SERPL-MCNC: 16 MG/DL (ref 8–22)
CALCIUM SERPL-MCNC: 9.3 MG/DL (ref 8.5–10.5)
CHLORIDE SERPL-SCNC: 103 MMOL/L (ref 96–112)
CHOLEST SERPL-MCNC: 168 MG/DL (ref 100–199)
CO2 SERPL-SCNC: 24 MMOL/L (ref 20–33)
CREAT SERPL-MCNC: 0.8 MG/DL (ref 0.5–1.4)
ERYTHROCYTE [DISTWIDTH] IN BLOOD BY AUTOMATED COUNT: 42.5 FL (ref 35.9–50)
EST. AVERAGE GLUCOSE BLD GHB EST-MCNC: 100 MG/DL
GFR SERPLBLD CREATININE-BSD FMLA CKD-EPI: 114 ML/MIN/1.73 M 2
GLOBULIN SER CALC-MCNC: 3.3 G/DL (ref 1.9–3.5)
GLUCOSE SERPL-MCNC: 86 MG/DL (ref 65–99)
HBA1C MFR BLD: 5.1 % (ref 4–5.6)
HCT VFR BLD AUTO: 44.7 % (ref 42–52)
HDLC SERPL-MCNC: 35 MG/DL
HGB BLD-MCNC: 15.2 G/DL (ref 14–18)
LDLC SERPL CALC-MCNC: 103 MG/DL
MCH RBC QN AUTO: 31.5 PG (ref 27–33)
MCHC RBC AUTO-ENTMCNC: 34 G/DL (ref 33.7–35.3)
MCV RBC AUTO: 92.5 FL (ref 81.4–97.8)
PLATELET # BLD AUTO: 174 K/UL (ref 164–446)
PMV BLD AUTO: 10.1 FL (ref 9–12.9)
POTASSIUM SERPL-SCNC: 4.1 MMOL/L (ref 3.6–5.5)
PROT SERPL-MCNC: 7.6 G/DL (ref 6–8.2)
RBC # BLD AUTO: 4.83 M/UL (ref 4.7–6.1)
SODIUM SERPL-SCNC: 138 MMOL/L (ref 135–145)
TRIGL SERPL-MCNC: 151 MG/DL (ref 0–149)
WBC # BLD AUTO: 8.4 K/UL (ref 4.8–10.8)

## 2022-05-03 PROCEDURE — 83036 HEMOGLOBIN GLYCOSYLATED A1C: CPT

## 2022-05-03 PROCEDURE — 80053 COMPREHEN METABOLIC PANEL: CPT

## 2022-05-03 PROCEDURE — 85027 COMPLETE CBC AUTOMATED: CPT

## 2022-05-03 PROCEDURE — 80061 LIPID PANEL: CPT

## 2022-05-03 PROCEDURE — 36415 COLL VENOUS BLD VENIPUNCTURE: CPT

## 2022-05-04 DIAGNOSIS — R74.8 ELEVATED ALKALINE PHOSPHATASE MEASUREMENT: ICD-10-CM

## 2022-05-10 ENCOUNTER — OFFICE VISIT (OUTPATIENT)
Dept: URGENT CARE | Facility: PHYSICIAN GROUP | Age: 42
End: 2022-05-10
Payer: COMMERCIAL

## 2022-05-10 VITALS
BODY MASS INDEX: 39.17 KG/M2 | HEIGHT: 75 IN | SYSTOLIC BLOOD PRESSURE: 122 MMHG | OXYGEN SATURATION: 95 % | WEIGHT: 315 LBS | TEMPERATURE: 97.8 F | HEART RATE: 71 BPM | RESPIRATION RATE: 16 BRPM | DIASTOLIC BLOOD PRESSURE: 80 MMHG

## 2022-05-10 DIAGNOSIS — J02.9 SORE THROAT: ICD-10-CM

## 2022-05-10 LAB
EXTERNAL QUALITY CONTROL: NORMAL
INT CON NEG: NORMAL
INT CON POS: NORMAL
S PYO AG THROAT QL: NEGATIVE
SARS-COV+SARS-COV-2 AG RESP QL IA.RAPID: NEGATIVE

## 2022-05-10 PROCEDURE — 87880 STREP A ASSAY W/OPTIC: CPT | Performed by: PHYSICIAN ASSISTANT

## 2022-05-10 PROCEDURE — 99213 OFFICE O/P EST LOW 20 MIN: CPT | Performed by: PHYSICIAN ASSISTANT

## 2022-05-10 PROCEDURE — 87426 SARSCOV CORONAVIRUS AG IA: CPT | Performed by: PHYSICIAN ASSISTANT

## 2022-05-10 RX ORDER — AZELASTINE HYDROCHLORIDE, FLUTICASONE PROPIONATE 137; 50 UG/1; UG/1
SPRAY, METERED NASAL
COMMUNITY
Start: 2022-04-21 | End: 2022-05-10

## 2022-05-10 ASSESSMENT — ENCOUNTER SYMPTOMS
DIZZINESS: 0
COUGH: 1
HOARSE VOICE: 1
SORE THROAT: 1
DIARRHEA: 0
EYE PAIN: 0
MYALGIAS: 0
NAUSEA: 0
PALPITATIONS: 0
FEVER: 0
BLURRED VISION: 0
VOMITING: 0
SWOLLEN GLANDS: 1
SINUS PAIN: 0
HEADACHES: 0
STRIDOR: 0
CHILLS: 0
SHORTNESS OF BREATH: 0
ABDOMINAL PAIN: 0

## 2022-05-10 ASSESSMENT — FIBROSIS 4 INDEX: FIB4 SCORE: 0.84

## 2022-05-10 NOTE — PROGRESS NOTES
Subjective     Camacho Cardona is a 41 y.o. male who presents with Sore Throat (Chest congestion,headache,loss of voice x2 days )    Pharyngitis   This is a new problem. The current episode started in the past 7 days (3-4 DAYS). The problem has been unchanged. Neither side of throat is experiencing more pain than the other. There has been no fever. The pain is moderate. Associated symptoms include congestion, coughing (mostly in the mornings), a hoarse voice and swollen glands. Pertinent negatives include no abdominal pain, diarrhea, ear pain, headaches, shortness of breath, stridor or vomiting. He has had no exposure to strep or mono.       Review of Systems   Constitutional: Positive for malaise/fatigue. Negative for chills and fever.   HENT: Positive for congestion, hoarse voice and sore throat. Negative for ear pain and sinus pain.    Eyes: Negative for blurred vision and pain.   Respiratory: Positive for cough (mostly in the mornings). Negative for shortness of breath and stridor.    Cardiovascular: Negative for chest pain and palpitations.   Gastrointestinal: Negative for abdominal pain, diarrhea, nausea and vomiting.   Musculoskeletal: Negative for myalgias.   Skin: Negative for rash.   Neurological: Negative for dizziness and headaches.         PMH:  has no past medical history on file.  MEDS:   Current Outpatient Medications:   •  Azelastine & Fluticasone 137 & 50 MCG/ACT Therapy Pack, Administer 1 Application into affected nostril(S) 2 times a day., Disp: 1 Each, Rfl: 5  •  acetaminophen (TYLENOL) 325 MG Tab, Take 2 Tablets by mouth every 6 hours., Disp: 30 Tablet, Rfl: 0  •  ibuprofen (MOTRIN) 200 MG Tab, Take 600-800 mg by mouth every 6 hours as needed. Indications: Pain, Disp: , Rfl:   ALLERGIES:   Allergies   Allergen Reactions   • Robaxin [Kdc:Yellow Dye+Methocarbamol] Anaphylaxis   • Morphine    • Pcn [Penicillins]    • Robaxin [Methocarbamol] Anaphylaxis     SURGHX: History reviewed. No  "pertinent surgical history.  SOCHX:  reports that he has never smoked. His smokeless tobacco use includes chew. He reports current alcohol use of about 1.0 oz of alcohol per week. He reports that he does not use drugs.  FH: Family history was reviewed, no pertinent findings to report      Objective     /80 (BP Location: Left arm, Patient Position: Sitting, BP Cuff Size: Adult)   Pulse 71   Temp 36.6 °C (97.8 °F) (Temporal)   Resp 16   Ht 1.905 m (6' 3\")   Wt (!) 147 kg (323 lb)   SpO2 95%   BMI 40.37 kg/m²      Physical Exam  Constitutional:       Appearance: He is well-developed.   HENT:      Head: Normocephalic and atraumatic.      Right Ear: Tympanic membrane, ear canal and external ear normal.      Left Ear: Tympanic membrane, ear canal and external ear normal.      Nose: Mucosal edema and congestion present. No rhinorrhea.      Mouth/Throat:      Lips: Pink.      Mouth: Mucous membranes are moist.      Pharynx: Oropharynx is clear.   Eyes:      Conjunctiva/sclera: Conjunctivae normal.      Pupils: Pupils are equal, round, and reactive to light.   Cardiovascular:      Rate and Rhythm: Normal rate and regular rhythm.      Heart sounds: Normal heart sounds. No murmur heard.  Pulmonary:      Effort: Pulmonary effort is normal.      Breath sounds: Normal breath sounds. No wheezing.   Musculoskeletal:      Cervical back: Normal range of motion.   Lymphadenopathy:      Cervical: No cervical adenopathy.   Skin:     General: Skin is warm and dry.      Capillary Refill: Capillary refill takes less than 2 seconds.   Neurological:      Mental Status: He is alert and oriented to person, place, and time.   Psychiatric:         Behavior: Behavior normal.         Judgment: Judgment normal.       POCT Rapid Strep A - Negative      POCT SARS-COV Antigen STEPHENIE (Symptomatic only) - Negative    Assessment & Plan     1. Sore throat  - POCT Rapid Strep A  - POCT SARS-COV Antigen STEPHENIE (Symptomatic only)  -Supportive care " discussed to include salt water gargles, throat lozenges, and increased fluid intake          Differential Diagnosis, natural history, and supportive care discussed. Return to the Urgent Care or follow up with your PCP if symptoms fail to resolve, or for any new or worsening symptoms. Emergency room precautions discussed. Patient and/or family appears understanding of information.

## 2022-05-10 NOTE — LETTER
May 10, 2022         Patient: Camacho Cardona   YOB: 1980   Date of Visit: 5/10/2022           To Whom it May Concern:    Camacho Cardona was seen in my clinic on 5/10/2022. Please excuse his absence from work for 5/9/2022 and 5/10/2022.  He tested negative for COVID-19 virus and strep throat.  He may return to work tomorrow, 5/11/2022.    If you have any questions or concerns, please don't hesitate to call.        Sincerely,           Mag Vela P.A.-C.  Electronically Signed

## 2022-06-17 ENCOUNTER — HOSPITAL ENCOUNTER (OUTPATIENT)
Dept: LAB | Facility: MEDICAL CENTER | Age: 42
End: 2022-06-17
Attending: PHYSICIAN ASSISTANT
Payer: COMMERCIAL

## 2022-06-17 ENCOUNTER — OFFICE VISIT (OUTPATIENT)
Dept: URGENT CARE | Facility: PHYSICIAN GROUP | Age: 42
End: 2022-06-17
Payer: COMMERCIAL

## 2022-06-17 VITALS
DIASTOLIC BLOOD PRESSURE: 70 MMHG | BODY MASS INDEX: 39.17 KG/M2 | RESPIRATION RATE: 16 BRPM | HEART RATE: 87 BPM | OXYGEN SATURATION: 96 % | HEIGHT: 75 IN | SYSTOLIC BLOOD PRESSURE: 126 MMHG | TEMPERATURE: 98.4 F | WEIGHT: 315 LBS

## 2022-06-17 DIAGNOSIS — G89.29 CHRONIC NONINTRACTABLE HEADACHE, UNSPECIFIED HEADACHE TYPE: ICD-10-CM

## 2022-06-17 DIAGNOSIS — R00.2 HEART PALPITATIONS: ICD-10-CM

## 2022-06-17 DIAGNOSIS — R51.9 CHRONIC NONINTRACTABLE HEADACHE, UNSPECIFIED HEADACHE TYPE: ICD-10-CM

## 2022-06-17 DIAGNOSIS — R35.0 URINARY FREQUENCY: ICD-10-CM

## 2022-06-17 LAB
ANION GAP SERPL CALC-SCNC: 10 MMOL/L (ref 7–16)
APPEARANCE UR: CLEAR
BASOPHILS # BLD AUTO: 0.4 % (ref 0–1.8)
BASOPHILS # BLD: 0.02 K/UL (ref 0–0.12)
BILIRUB UR STRIP-MCNC: NEGATIVE MG/DL
BUN SERPL-MCNC: 20 MG/DL (ref 8–22)
CALCIUM SERPL-MCNC: 9.4 MG/DL (ref 8.5–10.5)
CHLORIDE SERPL-SCNC: 109 MMOL/L (ref 96–112)
CO2 SERPL-SCNC: 24 MMOL/L (ref 20–33)
COLOR UR AUTO: NORMAL
CREAT SERPL-MCNC: 0.83 MG/DL (ref 0.5–1.4)
EOSINOPHIL # BLD AUTO: 0.07 K/UL (ref 0–0.51)
EOSINOPHIL NFR BLD: 1.4 % (ref 0–6.9)
ERYTHROCYTE [DISTWIDTH] IN BLOOD BY AUTOMATED COUNT: 41.4 FL (ref 35.9–50)
GFR SERPLBLD CREATININE-BSD FMLA CKD-EPI: 113 ML/MIN/1.73 M 2
GLUCOSE SERPL-MCNC: 101 MG/DL (ref 65–99)
GLUCOSE UR STRIP.AUTO-MCNC: NEGATIVE MG/DL
HCT VFR BLD AUTO: 44.8 % (ref 42–52)
HGB BLD-MCNC: 15.8 G/DL (ref 14–18)
IMM GRANULOCYTES # BLD AUTO: 0.01 K/UL (ref 0–0.11)
IMM GRANULOCYTES NFR BLD AUTO: 0.2 % (ref 0–0.9)
KETONES UR STRIP.AUTO-MCNC: NEGATIVE MG/DL
LEUKOCYTE ESTERASE UR QL STRIP.AUTO: NEGATIVE
LYMPHOCYTES # BLD AUTO: 1.87 K/UL (ref 1–4.8)
LYMPHOCYTES NFR BLD: 37 % (ref 22–41)
MAGNESIUM SERPL-MCNC: 2.1 MG/DL (ref 1.5–2.5)
MCH RBC QN AUTO: 31.9 PG (ref 27–33)
MCHC RBC AUTO-ENTMCNC: 35.3 G/DL (ref 33.7–35.3)
MCV RBC AUTO: 90.5 FL (ref 81.4–97.8)
MONOCYTES # BLD AUTO: 0.34 K/UL (ref 0–0.85)
MONOCYTES NFR BLD AUTO: 6.7 % (ref 0–13.4)
NEUTROPHILS # BLD AUTO: 2.74 K/UL (ref 1.82–7.42)
NEUTROPHILS NFR BLD: 54.3 % (ref 44–72)
NITRITE UR QL STRIP.AUTO: NEGATIVE
NRBC # BLD AUTO: 0 K/UL
NRBC BLD-RTO: 0 /100 WBC
PH UR STRIP.AUTO: 5.5 [PH] (ref 5–8)
PLATELET # BLD AUTO: 179 K/UL (ref 164–446)
PMV BLD AUTO: 9.7 FL (ref 9–12.9)
POTASSIUM SERPL-SCNC: 4.1 MMOL/L (ref 3.6–5.5)
PROT UR QL STRIP: NEGATIVE MG/DL
RBC # BLD AUTO: 4.95 M/UL (ref 4.7–6.1)
RBC UR QL AUTO: NEGATIVE
SODIUM SERPL-SCNC: 143 MMOL/L (ref 135–145)
SP GR UR STRIP.AUTO: 1.03
TSH SERPL DL<=0.005 MIU/L-ACNC: 1.05 UIU/ML (ref 0.38–5.33)
UROBILINOGEN UR STRIP-MCNC: 0.2 MG/DL
WBC # BLD AUTO: 5.1 K/UL (ref 4.8–10.8)

## 2022-06-17 PROCEDURE — 84443 ASSAY THYROID STIM HORMONE: CPT

## 2022-06-17 PROCEDURE — 36415 COLL VENOUS BLD VENIPUNCTURE: CPT

## 2022-06-17 PROCEDURE — 93000 ELECTROCARDIOGRAM COMPLETE: CPT | Performed by: PHYSICIAN ASSISTANT

## 2022-06-17 PROCEDURE — 83735 ASSAY OF MAGNESIUM: CPT

## 2022-06-17 PROCEDURE — 80048 BASIC METABOLIC PNL TOTAL CA: CPT

## 2022-06-17 PROCEDURE — 85025 COMPLETE CBC W/AUTO DIFF WBC: CPT

## 2022-06-17 PROCEDURE — 81002 URINALYSIS NONAUTO W/O SCOPE: CPT | Performed by: PHYSICIAN ASSISTANT

## 2022-06-17 PROCEDURE — 99215 OFFICE O/P EST HI 40 MIN: CPT | Performed by: PHYSICIAN ASSISTANT

## 2022-06-17 RX ORDER — BUTALBITAL, ACETAMINOPHEN AND CAFFEINE 50; 325; 40 MG/1; MG/1; MG/1
1 TABLET ORAL EVERY 4 HOURS PRN
Qty: 20 TABLET | Refills: 0 | Status: SHIPPED | OUTPATIENT
Start: 2022-06-17 | End: 2022-08-04 | Stop reason: SDUPTHER

## 2022-06-17 ASSESSMENT — ENCOUNTER SYMPTOMS
PALPITATIONS: 1
ABDOMINAL PAIN: 0
FLANK PAIN: 0
DIZZINESS: 1
DIARRHEA: 0
VOMITING: 0
NAUSEA: 0
PHOTOPHOBIA: 1
NECK PAIN: 1
CHILLS: 0
SHORTNESS OF BREATH: 0
CONSTIPATION: 0
HEADACHES: 1
FEVER: 0

## 2022-06-17 ASSESSMENT — FIBROSIS 4 INDEX: FIB4 SCORE: 0.86

## 2022-06-17 NOTE — PROGRESS NOTES
"Subjective     Camacho Cardona is a 42 y.o. male who presents with Headache (march 11 was electrocuted and since then been having /severe neck and headache, migraines, heart fluttering, )        HPI:  Camacho Cardona is a 42 y.o. male who presents today for evaluation of multiple complaints.  Patient was hospitalized on 3/11/2022 after being electrocuted at work.  He states that since that time he has been having fairly persistent headaches that only seem to be relieved by Fioricet and he has also had episodes of dizziness.  Few days ago he ran out of the Fioricet and notes that the headaches not being relieved by OTC Tylenol or ibuprofen.  He also notes that he has been feeling as though his heart is \"fluttering\" or \"skipping beats\" over the past 3 days.  He has no associated chest pain or shortness of breath with this.  No visual changes or focal weakness.  His initial case of electrocution was through [employer]'s [employer].  He has not had any specialty follow-ups secondary to the case being initially denied and now he is going through a .  Patient also notes that he has been having some frequency of urination over the past few days.  No painful urination, abdominal pain, or blood in the urine.      Review of Systems   Constitutional: Negative for chills and fever.   Eyes: Positive for photophobia.   Respiratory: Negative for shortness of breath.    Cardiovascular: Positive for palpitations. Negative for chest pain and leg swelling.   Gastrointestinal: Negative for abdominal pain, constipation, diarrhea, nausea and vomiting.   Genitourinary: Positive for frequency. Negative for dysuria, flank pain, hematuria and urgency.   Musculoskeletal: Positive for neck pain.   Skin: Negative for rash.   Neurological: Positive for dizziness and headaches.           PMH:  has no past medical history on file.  MEDS:   Current Outpatient Medications:   •  butalbital/apap/caffeine (FIORICET) -40 mg Tab, " "Take 1 Tablet by mouth every four hours as needed for Headache or Migraine for up to 7 days., Disp: 20 Tablet, Rfl: 0  •  acetaminophen (TYLENOL) 325 MG Tab, Take 2 Tablets by mouth every 6 hours., Disp: 30 Tablet, Rfl: 0  •  ibuprofen (MOTRIN) 200 MG Tab, Take 600-800 mg by mouth every 6 hours as needed. Indications: Pain, Disp: , Rfl:   •  Azelastine & Fluticasone 137 & 50 MCG/ACT Therapy Pack, Administer 1 Application into affected nostril(S) 2 times a day. (Patient not taking: Reported on 6/17/2022), Disp: 1 Each, Rfl: 5  ALLERGIES:   Allergies   Allergen Reactions   • Robaxin [Kdc:Yellow Dye+Methocarbamol] Anaphylaxis   • Morphine    • Pcn [Penicillins]    • Robaxin [Methocarbamol] Anaphylaxis     SURGHX: History reviewed. No pertinent surgical history.  SOCHX:  reports that he has never smoked. His smokeless tobacco use includes chew. He reports current alcohol use of about 1.0 oz of alcohol per week. He reports that he does not use drugs.  FH: Family history was reviewed, no pertinent findings to report      Objective     /70 (BP Location: Right arm, Patient Position: Sitting, BP Cuff Size: Large adult)   Pulse 87   Temp 36.9 °C (98.4 °F) (Temporal)   Resp 16   Ht 1.905 m (6' 3\")   Wt (!) 145 kg (320 lb)   SpO2 96%   BMI 40.00 kg/m²      Physical Exam  Constitutional:       Appearance: He is well-developed.   HENT:      Head: Normocephalic and atraumatic.      Right Ear: External ear normal.      Left Ear: External ear normal.   Eyes:      Extraocular Movements: Extraocular movements intact.      Conjunctiva/sclera: Conjunctivae normal.      Pupils: Pupils are equal, round, and reactive to light.   Cardiovascular:      Rate and Rhythm: Normal rate and regular rhythm.      Pulses: Normal pulses.      Heart sounds: Normal heart sounds. No murmur heard.  Pulmonary:      Effort: Pulmonary effort is normal.      Breath sounds: Normal breath sounds. No wheezing.   Musculoskeletal:      Cervical back: " Normal range of motion.   Lymphadenopathy:      Cervical: No cervical adenopathy.   Skin:     General: Skin is warm and dry.      Capillary Refill: Capillary refill takes less than 2 seconds.   Neurological:      General: No focal deficit present.      Mental Status: He is alert and oriented to person, place, and time.      GCS: GCS eye subscore is 4. GCS verbal subscore is 5. GCS motor subscore is 6.      Coordination: Coordination is intact.      Gait: Gait is intact.   Psychiatric:         Behavior: Behavior normal.         Judgment: Judgment normal.              EKG Interpretation - HR is 64 normal sinus rhythm      POCT Urinalysis  Lab Results   Component Value Date/Time    POCCOLOR Dark Yellow 06/17/2022 10:52 AM    POCAPPEAR Clear 06/17/2022 10:52 AM    POCLEUKEST Negative 06/17/2022 10:52 AM    POCNITRITE Negative 06/17/2022 10:52 AM    POCUROBILIGE 0.2 06/17/2022 10:52 AM    POCPROTEIN Negative 06/17/2022 10:52 AM    POCURPH 5.5 06/17/2022 10:52 AM    POCBLOOD Negative 06/17/2022 10:52 AM    POCSPGRV 1.030 06/17/2022 10:52 AM    POCKETONES Negative 06/17/2022 10:52 AM    POCBILIRUBIN Negative 06/17/2022 10:52 AM    POCGLUCUA Negative 06/17/2022 10:52 AM          Assessment & Plan     1. Heart palpitations  - EKG - Clinic Performed  - REFERRAL TO CARDIOLOGY  - MAGNESIUM; Future  - Basic Metabolic Panel; Future  - CBC WITH DIFFERENTIAL; Future  - TSH; Future    2. Urinary frequency  - POCT Urinalysis    3. Chronic nonintractable headache, unspecified headache type  - butalbital/apap/caffeine (FIORICET) -40 mg Tab; Take 1 Tablet by mouth every four hours as needed for Headache or Migraine for up to 7 days.  Dispense: 20 Tablet; Refill: 0  - Referral to Neurology      Patient presents with multiple complaints, some of which have been ongoing for a few months after being electrocuted at work.  Lab work obtained to further evaluate the heart palpitations which did not reveal any abnormalities.  EKG showed  normal sinus rhythm.  Urinalysis not indicative of infection.  Referral to cardiology placed to further evaluate his heart palpitations and referral to neurology placed for the persistent headaches.  Recommend he also follow-up with his primary care provider regarding his constellation of symptoms and the urinary frequency.  Recommended that patient avoid all caffeine and alcohol intake in the interim.  Strict ER precautions discussed while waiting for specialty appointments.      My total time spent caring for the patient on the day of the encounter was 45 minutes.  A considerable amount of this time was spent reviewing patient's chart to review scans and tests done during his hospital admission in March.    This does not include time spent on separately billable procedures/tests.              Differential Diagnosis, natural history, and supportive care discussed. Return to the Urgent Care or follow up with your PCP if symptoms fail to resolve, or for any new or worsening symptoms. Emergency room precautions discussed. Patient and/or family appears understanding of information.

## 2022-06-17 NOTE — LETTER
June 17, 2022         Patient: Camacho Cardona   YOB: 1980   Date of Visit: 6/17/2022           To Whom it May Concern:    Camacho Cardona was seen in my clinic on 6/17/2022.  Please excuse his absence from work for 6/15/2022 through 6/17/2022.    If you have any questions or concerns, please don't hesitate to call.        Sincerely,           Mag Vela P.A.-C.  Electronically Signed

## 2022-08-04 ENCOUNTER — OFFICE VISIT (OUTPATIENT)
Dept: NEUROLOGY | Facility: MEDICAL CENTER | Age: 42
End: 2022-08-04
Attending: NURSE PRACTITIONER
Payer: COMMERCIAL

## 2022-08-04 VITALS
HEIGHT: 75 IN | SYSTOLIC BLOOD PRESSURE: 116 MMHG | WEIGHT: 315 LBS | BODY MASS INDEX: 39.17 KG/M2 | HEART RATE: 76 BPM | DIASTOLIC BLOOD PRESSURE: 76 MMHG

## 2022-08-04 DIAGNOSIS — R51.9 NONINTRACTABLE HEADACHE, UNSPECIFIED CHRONICITY PATTERN, UNSPECIFIED HEADACHE TYPE: ICD-10-CM

## 2022-08-04 DIAGNOSIS — G89.29 CHRONIC NONINTRACTABLE HEADACHE, UNSPECIFIED HEADACHE TYPE: ICD-10-CM

## 2022-08-04 DIAGNOSIS — R51.9 CHRONIC NONINTRACTABLE HEADACHE, UNSPECIFIED HEADACHE TYPE: ICD-10-CM

## 2022-08-04 PROCEDURE — 99212 OFFICE O/P EST SF 10 MIN: CPT | Performed by: NURSE PRACTITIONER

## 2022-08-04 PROCEDURE — 99204 OFFICE O/P NEW MOD 45 MIN: CPT | Performed by: NURSE PRACTITIONER

## 2022-08-04 RX ORDER — BUTALBITAL, ACETAMINOPHEN AND CAFFEINE 50; 325; 40 MG/1; MG/1; MG/1
1 TABLET ORAL 2 TIMES DAILY PRN
Qty: 34 TABLET | Refills: 0 | Status: SHIPPED | OUTPATIENT
Start: 2022-08-04 | End: 2022-10-03

## 2022-08-04 ASSESSMENT — ENCOUNTER SYMPTOMS
TINGLING: 1
DIZZINESS: 0
NAUSEA: 0
SENSORY CHANGE: 1
PALPITATIONS: 1
HEADACHES: 1
FOCAL WEAKNESS: 1
NERVOUS/ANXIOUS: 0
BACK PAIN: 0
DEPRESSION: 0
WEAKNESS: 0
DOUBLE VISION: 0
NECK PAIN: 0
BLURRED VISION: 0
VOMITING: 0

## 2022-08-04 ASSESSMENT — FIBROSIS 4 INDEX: FIB4 SCORE: 0.84

## 2022-08-04 NOTE — PROGRESS NOTES
Subjective         HPI  Barretter Yfn Cardona is a 42 y.o. who presents with headaches after being electrocuted on 3/11/2022 @ 9:40 AM.  He was transferred to [facility].  He was electrocuted via  Switch that was not grounded , it went through his right index finger and exit was right foot, medial aspect.     He was referred by Mag Vela PA-C.    He has been having headaches and heart flutter since then.    He did not have a prior history of headache.    PMH: None  Social:  Social alcohol and chewing tobacco (not daily), denies drug use.     He has headaches that originate in the base of the skull on the right and travel up the back of his head as well as to the base of the skull on the left.   When he feels the headache, the heart flutters. He notices now that he tends to use his left side for certain activities where he had previously used right side ie: getting into truck.        Review of Systems   HENT: Negative for ear pain and tinnitus.    Eyes: Negative for blurred vision and double vision.   Cardiovascular: Positive for palpitations. Negative for chest pain.   Gastrointestinal: Negative for nausea and vomiting.   Musculoskeletal: Negative for back pain and neck pain.   Neurological: Positive for tingling, sensory change, focal weakness and headaches. Negative for dizziness and weakness.        Tingling right arm and right leg, right buttock, center of back, sometimes goes to the left.  Denies previous problems with back or sciatica    When standing up his right leg feels weak.    Right hand is weaker.   Psychiatric/Behavioral: Negative for depression. The patient is not nervous/anxious.            Current Outpatient Medications on File Prior to Visit   Medication Sig Dispense Refill   • butalbital/apap/caffeine (FIORICET) -40 mg Tab Take 1 Tablet by mouth every four hours as needed for Headache or Migraine for up to 7 days. 20 Tablet 0   • Azelastine & Fluticasone 137 & 50 MCG/ACT  "Therapy Pack Administer 1 Application into affected nostril(S) 2 times a day. 1 Each 5   • acetaminophen (TYLENOL) 325 MG Tab Take 2 Tablets by mouth every 6 hours. 30 Tablet 0   • ibuprofen (MOTRIN) 200 MG Tab Take 600-800 mg by mouth every 6 hours as needed. Indications: Pain       No current facility-administered medications on file prior to visit.         Objective    I personally reviewed imaging below and agree with findings:    CT head 3/11/2022     No acute intracranial abnormality.    /76 (BP Location: Right arm, Patient Position: Sitting, BP Cuff Size: Adult)   Pulse 76   Ht 1.905 m (6' 3\")   Wt (!) 156 kg (343 lb 0.6 oz)   BMI 42.88 kg/m²        PHYSICAL ASSESSMENT  Constitutional:  Alert, no apparent distress,  Psych:   mood and affect WNL  Muskuloskeletal:  Moves all extremities equally, strength 5/5  Flexors/extensors except for right bicep flexor 4+/5, no drift  NEUROLOGICAL ASSESSMENT  Oriented X 4, speech fluent, naming and memory intact  CN II: Visual fields are full to confrontation. Fundoscopic exam is normal with sharp discs and no vascular changes. Pupils are 4 mm and briskly reactive to light.   CN III: IV, VI  EOMs intact, no ptosis  CN V: Facial sensation decreased Right V2/V3. Corneal responses are intact.  CN VII: Face is symmetric with normal eye closure and smile.  CN VIII Hearing is normal to rubbing fingers  CN IX, X: Palate elevates symmetrically. Phonation is normal.  CN XI: Head turning and shoulder shrug are intact  CN XII: Tongue is midline with normal movements and no atrophy.                           Sensation to PP decreased RUE/RLE                 No limb ataxia with finger to nose and heel to shin                 Ambulates with steady gait.                 Rhomberg negative                Biceps,brachioradialis, tricep all 1+ bilaterlaly, and patellar reflexes 2+ bilaterally      Cardiovascular:    S1S2, no abnormal rhythm auscultated, no peripheral edema  Neck:    "                  No carotid bruits noted   Pulmonary:            Respirations easy, lungs clear to auscultation all fields.     Skin:                     No obvious rashes.          Assessment & Plan     1. Nonintractable headache, unspecified chronicity pattern, unspecified headache type    Had recent electrocution, unknown if these headaches are related, did not have headaches prior to electrocution.    May have some inflammation in neck given that headaches occur in the back of the head.    Will give steroid dose pack to knock out pain    New symptoms s/p trauma, MRI brain    May continue Fioricet at home, No more than 4 per week, 34 tablets given for the next 60 days, PDMB queried,  Controlled substance agreement signed.    Start magnesium oxide 400mg gel cap by mouth every night, may take extra dose if needed for headache (over the counter), hold for diarrhea    Recommend B complex daily for headache.                     I spent 34 minutes caring for patient, my time includes reviewing the chart, obtaining current HPI, exam, discussion of disease process, ordering testing and medications and counseling.      I counseled patient on migraine triggers, lifestyle changes, medication overuse, supplements and medication side effects.      Follow up in 2 months.

## 2022-08-04 NOTE — PATIENT INSTRUCTIONS
I ordered a MRI, radiology should reach out to you to schedule, if you don't hear from them within 2-3 weeks, you can call [phone] to schedule.     Take 400mg of magnesium oxide gel cap every night, may take extra dose if you have a headache.     Take B Complex daily, this may help with the tingling.

## 2022-08-29 ENCOUNTER — TELEPHONE (OUTPATIENT)
Dept: CARDIOLOGY | Facility: MEDICAL CENTER | Age: 42
End: 2022-08-29
Payer: COMMERCIAL

## 2022-08-29 NOTE — TELEPHONE ENCOUNTER
Spoke with pt and confirmed this will be their first time seeing a cardiologist.  Confirmed that all recent labs, notes, and cardiac testing are in pts chart. Appointment time, date, and location confirmed with patient.

## 2022-08-31 ENCOUNTER — HOSPITAL ENCOUNTER (OUTPATIENT)
Dept: RADIOLOGY | Facility: MEDICAL CENTER | Age: 42
End: 2022-08-31
Attending: NURSE PRACTITIONER
Payer: COMMERCIAL

## 2022-08-31 DIAGNOSIS — R51.9 NONINTRACTABLE HEADACHE, UNSPECIFIED CHRONICITY PATTERN, UNSPECIFIED HEADACHE TYPE: ICD-10-CM

## 2022-08-31 PROCEDURE — 70551 MRI BRAIN STEM W/O DYE: CPT

## 2022-09-13 ENCOUNTER — OFFICE VISIT (OUTPATIENT)
Dept: CARDIOLOGY | Facility: MEDICAL CENTER | Age: 42
End: 2022-09-13
Attending: PHYSICIAN ASSISTANT
Payer: COMMERCIAL

## 2022-09-13 VITALS
HEIGHT: 75 IN | OXYGEN SATURATION: 95 % | WEIGHT: 313.6 LBS | DIASTOLIC BLOOD PRESSURE: 100 MMHG | SYSTOLIC BLOOD PRESSURE: 138 MMHG | RESPIRATION RATE: 18 BRPM | HEART RATE: 103 BPM | BODY MASS INDEX: 38.99 KG/M2

## 2022-09-13 DIAGNOSIS — R00.2 HEART PALPITATIONS: Primary | ICD-10-CM

## 2022-09-13 DIAGNOSIS — Z71.89 COUNSELING ON HEALTH PROMOTION AND DISEASE PREVENTION: ICD-10-CM

## 2022-09-13 DIAGNOSIS — E78.2 MIXED HYPERLIPIDEMIA: ICD-10-CM

## 2022-09-13 LAB — EKG IMPRESSION: NORMAL

## 2022-09-13 PROCEDURE — 93000 ELECTROCARDIOGRAM COMPLETE: CPT | Performed by: INTERNAL MEDICINE

## 2022-09-13 PROCEDURE — 99203 OFFICE O/P NEW LOW 30 MIN: CPT | Performed by: INTERNAL MEDICINE

## 2022-09-13 RX ORDER — OXYCODONE AND ACETAMINOPHEN 2.5; 3 MG/1; MG/1
1 TABLET ORAL EVERY 4 HOURS PRN
COMMUNITY

## 2022-09-13 ASSESSMENT — FIBROSIS 4 INDEX: FIB4 SCORE: 0.84
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
## 2022-09-13 NOTE — PATIENT INSTRUCTIONS
Checking Blood Pressure:  -Blood pressure cuff, spend in the $40-65, with good return policy - [employer]  -It should be automatic, upper arm, measure your arm to get the correct size, probably adult Large but your arm should be under 16.5-17 cm. If you need an XL cuff, you will have to have it special ordered from a pharmacy or durable medical equipment company.  -Put the cuff in place, rest arm on table near height of your heart, sit quietly for 5 min, legs uncrossed, with back support, then take your blood pressure, write it down, keep a log  -Check your blood pressure in the morning and evening, and send me your log in 2 weeks, either through Payment plugin or by calling our office.  -Can bring your cuff to at least one appointment where it can be calibrated to a manual cuff if you are concerned.  -Goal blood pressure is under 120/80.  If you think your BP is overall too high, let us know in the office, we can adjust medications, can use Payment plugin or call the [place] office: [phone].    Salt=sodium=sea salt, guidelines say stay under 2,500 mg daily  Get salt smart, start looking at labels, count it up.  Salt is hidden in everything, salad dressing, sauces, cheese, most canned food, any processed meat.

## 2022-10-11 ENCOUNTER — OFFICE VISIT (OUTPATIENT)
Dept: NEUROLOGY | Facility: MEDICAL CENTER | Age: 42
End: 2022-10-11
Attending: NURSE PRACTITIONER
Payer: COMMERCIAL

## 2022-10-11 ENCOUNTER — NON-PROVIDER VISIT (OUTPATIENT)
Dept: CARDIOLOGY | Facility: MEDICAL CENTER | Age: 42
End: 2022-10-11
Attending: INTERNAL MEDICINE
Payer: COMMERCIAL

## 2022-10-11 VITALS
BODY MASS INDEX: 39.17 KG/M2 | HEART RATE: 72 BPM | OXYGEN SATURATION: 99 % | TEMPERATURE: 97.4 F | DIASTOLIC BLOOD PRESSURE: 80 MMHG | HEIGHT: 75 IN | SYSTOLIC BLOOD PRESSURE: 138 MMHG | WEIGHT: 315 LBS

## 2022-10-11 DIAGNOSIS — I49.1 PREMATURE ATRIAL CONTRACTIONS: ICD-10-CM

## 2022-10-11 DIAGNOSIS — R00.2 HEART PALPITATIONS: ICD-10-CM

## 2022-10-11 DIAGNOSIS — I49.3 PVCS (PREMATURE VENTRICULAR CONTRACTIONS): ICD-10-CM

## 2022-10-11 DIAGNOSIS — R51.9 NONINTRACTABLE HEADACHE, UNSPECIFIED CHRONICITY PATTERN, UNSPECIFIED HEADACHE TYPE: ICD-10-CM

## 2022-10-11 PROCEDURE — 99213 OFFICE O/P EST LOW 20 MIN: CPT | Performed by: NURSE PRACTITIONER

## 2022-10-11 PROCEDURE — 99211 OFF/OP EST MAY X REQ PHY/QHP: CPT | Performed by: NURSE PRACTITIONER

## 2022-10-11 RX ORDER — AMITRIPTYLINE HYDROCHLORIDE 25 MG/1
25 TABLET, FILM COATED ORAL NIGHTLY
Qty: 90 TABLET | Refills: 3 | Status: SHIPPED | OUTPATIENT
Start: 2022-10-11 | End: 2023-10-11

## 2022-10-11 ASSESSMENT — ENCOUNTER SYMPTOMS
TINGLING: 1
PALPITATIONS: 1
COUGH: 0
BLURRED VISION: 0
VOMITING: 0
NERVOUS/ANXIOUS: 0
HEADACHES: 1
NECK PAIN: 1
DEPRESSION: 0
FEVER: 0
NAUSEA: 0
SINUS PAIN: 0
DIZZINESS: 0
DOUBLE VISION: 0

## 2022-10-11 ASSESSMENT — FIBROSIS 4 INDEX: FIB4 SCORE: 0.84
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
## 2022-10-11 NOTE — PROGRESS NOTES
Patient enrolled in the 30 day [employer] [facility] Heart monitoring program per Leonel Vazquez MD.  >Office hook-up with [facility] transmitted, serial # [id number].  >Pending EOS

## 2022-11-11 ENCOUNTER — TELEPHONE (OUTPATIENT)
Dept: CARDIOLOGY | Facility: MEDICAL CENTER | Age: 42
End: 2022-11-11
Payer: COMMERCIAL

## 2022-11-14 PROCEDURE — 93268 ECG RECORD/REVIEW: CPT | Performed by: INTERNAL MEDICINE

## 2023-01-04 ENCOUNTER — TELEPHONE (OUTPATIENT)
Dept: CARDIOLOGY | Facility: MEDICAL CENTER | Age: 43
End: 2023-01-04
Payer: COMMERCIAL

## 2023-02-07 ENCOUNTER — OFFICE VISIT (OUTPATIENT)
Dept: CARDIOLOGY | Facility: MEDICAL CENTER | Age: 43
End: 2023-02-07
Payer: COMMERCIAL

## 2023-02-07 VITALS
HEART RATE: 89 BPM | DIASTOLIC BLOOD PRESSURE: 96 MMHG | OXYGEN SATURATION: 96 % | RESPIRATION RATE: 16 BRPM | SYSTOLIC BLOOD PRESSURE: 138 MMHG | HEIGHT: 75 IN | WEIGHT: 310 LBS | BODY MASS INDEX: 38.54 KG/M2

## 2023-02-07 DIAGNOSIS — Z71.89 COUNSELING ON HEALTH PROMOTION AND DISEASE PREVENTION: ICD-10-CM

## 2023-02-07 DIAGNOSIS — R00.2 HEART PALPITATIONS: Primary | ICD-10-CM

## 2023-02-07 DIAGNOSIS — R06.83 SNORING: ICD-10-CM

## 2023-02-07 DIAGNOSIS — E78.5 DYSLIPIDEMIA: ICD-10-CM

## 2023-02-07 DIAGNOSIS — I10 ESSENTIAL HYPERTENSION, BENIGN: ICD-10-CM

## 2023-02-07 DIAGNOSIS — I49.1 PREMATURE ATRIAL CONTRACTIONS: ICD-10-CM

## 2023-02-07 DIAGNOSIS — E66.9 OBESITY (BMI 30-39.9): ICD-10-CM

## 2023-02-07 DIAGNOSIS — I49.3 PVCS (PREMATURE VENTRICULAR CONTRACTIONS): ICD-10-CM

## 2023-02-07 PROCEDURE — 99214 OFFICE O/P EST MOD 30 MIN: CPT | Performed by: INTERNAL MEDICINE

## 2023-02-07 RX ORDER — METOPROLOL SUCCINATE 25 MG/1
12.5 TABLET, EXTENDED RELEASE ORAL NIGHTLY
Qty: 45 TABLET | Refills: 3 | Status: SHIPPED | OUTPATIENT
Start: 2023-02-07 | End: 2023-04-06 | Stop reason: SDUPTHER

## 2023-02-07 RX ORDER — METOPROLOL SUCCINATE 25 MG/1
12.5 TABLET, EXTENDED RELEASE ORAL DAILY
Qty: 45 TABLET | Refills: 3 | Status: SHIPPED | OUTPATIENT
Start: 2023-02-07 | End: 2023-02-07

## 2023-02-07 RX ORDER — LISINOPRIL 5 MG/1
5 TABLET ORAL DAILY
Qty: 90 TABLET | Refills: 3 | Status: SHIPPED | OUTPATIENT
Start: 2023-02-07 | End: 2023-04-06 | Stop reason: SDUPTHER

## 2023-02-07 ASSESSMENT — FIBROSIS 4 INDEX: FIB4 SCORE: 0.84
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
## 2023-02-08 NOTE — PATIENT INSTRUCTIONS
--Start lisinopril 5mg in the morning and metoprolol 12.5mg at night  --Send me an am and pm blood pressure log in 1 week after starting the medications  --Sleep medicine referral placed    Target ideal weight 200lbs.    Mediterranean diet.  Pritikin - used at [facility], probably one of the best for anti-inflammatory  DASH DIET - American Heart Association (mostly for hypertension)  Ornish Diet   Vegan diet (proven to reverse vascular disease)     Resources to learn more:  American Heart Association   Www.Cardiosmart.org, sponsored by the American College of cardiology.     A great diet is a healthy one that is sustainable with the ability to commit to it long term. Happy to see your will to make a change.     Intermittent fasting is also an additional option: limit your intake of food / drinks to 8 hours in a 24 hour time frame. It can be tough to adapt in the beginning and you can try it 1-2 days a week as a start and eventually will become a lifestyle.

## 2023-02-13 ENCOUNTER — TELEPHONE (OUTPATIENT)
Dept: CARDIOLOGY | Facility: MEDICAL CENTER | Age: 43
End: 2023-02-13
Payer: COMMERCIAL

## 2023-02-14 ENCOUNTER — TELEPHONE (OUTPATIENT)
Dept: CARDIOLOGY | Facility: MEDICAL CENTER | Age: 43
End: 2023-02-14
Payer: COMMERCIAL

## 2023-02-14 NOTE — TELEPHONE ENCOUNTER
AL        Caller: Yonas with [facility] case management    Topic/issue: they sent a letter about the patient's 2/7 appointment. They were asking if the doctor would be able to elaborate on the patient's Diagnosis and how it would apply to work comp and they were asking for a call back to discuss it    Callback Number: [phone]      Thank you    -Mino CANELA     Topical Clindamycin Pregnancy And Lactation Text: This medication is Pregnancy Category B and is considered safe during pregnancy. It is unknown if it is excreted in breast milk.

## 2023-02-14 NOTE — TELEPHONE ENCOUNTER
AL    Patient's  stopped by to drop off [facility] docs for [place]. Docs scanned into patients media and handed off to Darian WEINSTEIN.

## 2023-02-14 NOTE — TELEPHONE ENCOUNTER
"Document in Media on 02/14/23 \"[facility] [employer]\".     AL: Please review document and advise. Thanks!  "

## 2023-02-15 NOTE — TELEPHONE ENCOUNTER
Completed case management paperwork received from AL. Faxed to [facility] Yang  [phone]. Confirmation received. Scanned into [employer].

## 2023-03-15 ENCOUNTER — TELEPHONE (OUTPATIENT)
Dept: CARDIOLOGY | Facility: MEDICAL CENTER | Age: 43
End: 2023-03-15
Payer: COMMERCIAL
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
## 2023-03-15 NOTE — TELEPHONE ENCOUNTER
Called to speak to [facility] . They need more clarification from AL in writing, if not MMI, then they need a more detailed treatment plan and close follow up. MMI means can have maintenance but no treatment to improve patient's condition. Is metoprolol to make improvement in patient's condition or to not get worse? Or are we maintaining the condition with metoprolol, then patient would be MMI. If we are trying to improve condition, then patient needs to be seen ASAP for work comp compliance.     [facility] will put this request in writing and fax over. AL can put recommendations in writing or call her for further explanation if has questions. Will await fax request.

## 2023-03-16 NOTE — TELEPHONE ENCOUNTER
Per AL, patient needs follow up appointment with  to discuss.     Attempted to call [facility] to advise, left detailed message.    MyChart to patient.     Coordinators: If patient or [facility],  call back, please schedule ASAP appointment with AL. Thank you.

## 2023-03-16 NOTE — TELEPHONE ENCOUNTER
Fax request received from [facility] .    AL: From my understanding from the phone call yesterday, work comp needs to understand if the metoprolol is intended to improve patient's condition or is it to maintain patients current condition. If it is to improve the patients condition, then patient needs appointment. Would you be willing to call and discuss with the ? Or would you like a face to face appointment? The paperwork does seem unclear. Please let me know. Thank you!

## 2023-03-16 NOTE — TELEPHONE ENCOUNTER
AL    **Aviva will be in a appt today from 1-2pm**      Caller: [facility] [employer] - Workers Comp    Topic/issue: Aviva was calling you back, but I did see your message to us that if she or the pt calls back to schedule the soonest appt with AL. AL is currently booking out till April 21st & 24th and she states this would push the pt out of the states compliance. She is asking if you could call her back to see if AL would like to reschedule other pt's to get him seen sooner.      Callback Number: [phone] ([facility])

## 2023-03-17 NOTE — TELEPHONE ENCOUNTER
LAURENT Cyr,    I was able to schedule in the appt on 04- @ 9:40am with AL. I left a vm for [facility] and the pt to advise of this new appt and to call back for any additional details.      Thank you,    MIKLA

## 2023-04-06 ENCOUNTER — OFFICE VISIT (OUTPATIENT)
Dept: CARDIOLOGY | Facility: MEDICAL CENTER | Age: 43
End: 2023-04-06
Payer: COMMERCIAL

## 2023-04-06 VITALS
SYSTOLIC BLOOD PRESSURE: 130 MMHG | BODY MASS INDEX: 39.37 KG/M2 | HEART RATE: 75 BPM | RESPIRATION RATE: 18 BRPM | WEIGHT: 315 LBS | DIASTOLIC BLOOD PRESSURE: 98 MMHG | OXYGEN SATURATION: 97 %

## 2023-04-06 DIAGNOSIS — R06.83 SNORING: ICD-10-CM

## 2023-04-06 DIAGNOSIS — I10 ESSENTIAL HYPERTENSION, BENIGN: ICD-10-CM

## 2023-04-06 DIAGNOSIS — R00.2 HEART PALPITATIONS: Primary | ICD-10-CM

## 2023-04-06 DIAGNOSIS — I49.1 PREMATURE ATRIAL CONTRACTIONS: ICD-10-CM

## 2023-04-06 DIAGNOSIS — E66.9 OBESITY (BMI 30-39.9): ICD-10-CM

## 2023-04-06 DIAGNOSIS — I49.3 PVCS (PREMATURE VENTRICULAR CONTRACTIONS): ICD-10-CM

## 2023-04-06 PROCEDURE — 99214 OFFICE O/P EST MOD 30 MIN: CPT | Performed by: INTERNAL MEDICINE

## 2023-04-06 RX ORDER — LISINOPRIL 10 MG/1
10 TABLET ORAL DAILY
Qty: 90 TABLET | Refills: 3 | Status: SHIPPED | OUTPATIENT
Start: 2023-04-06

## 2023-04-06 RX ORDER — METOPROLOL SUCCINATE 25 MG/1
25 TABLET, EXTENDED RELEASE ORAL NIGHTLY
Qty: 90 TABLET | Refills: 3 | Status: SHIPPED | OUTPATIENT
Start: 2023-04-06 | End: 2023-06-06

## 2023-04-06 ASSESSMENT — FIBROSIS 4 INDEX: FIB4 SCORE: 0.84

## 2023-04-06 NOTE — PATIENT INSTRUCTIONS
-Increase metoprolol to 25mg at night  -Increase lisinopril to 10 mg in the am  -Send me a BP log, am and pm, 1 week after this change. Omron is a good brand. Upper arm is preferred.
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
## 2023-04-11 ENCOUNTER — APPOINTMENT (OUTPATIENT)
Dept: NEUROLOGY | Facility: MEDICAL CENTER | Age: 43
End: 2023-04-11
Attending: PSYCHIATRY & NEUROLOGY

## 2023-04-24 ENCOUNTER — OFFICE VISIT (OUTPATIENT)
Dept: CARDIOLOGY | Facility: MEDICAL CENTER | Age: 43
End: 2023-04-24
Payer: COMMERCIAL

## 2023-04-24 VITALS
HEIGHT: 75 IN | HEART RATE: 70 BPM | WEIGHT: 315 LBS | SYSTOLIC BLOOD PRESSURE: 124 MMHG | RESPIRATION RATE: 16 BRPM | DIASTOLIC BLOOD PRESSURE: 82 MMHG | OXYGEN SATURATION: 96 % | BODY MASS INDEX: 39.17 KG/M2

## 2023-04-24 DIAGNOSIS — I49.1 PREMATURE ATRIAL CONTRACTIONS: ICD-10-CM

## 2023-04-24 DIAGNOSIS — I49.3 PVCS (PREMATURE VENTRICULAR CONTRACTIONS): ICD-10-CM

## 2023-04-24 DIAGNOSIS — I10 ESSENTIAL HYPERTENSION, BENIGN: ICD-10-CM

## 2023-04-24 DIAGNOSIS — R00.2 HEART PALPITATIONS: Primary | ICD-10-CM

## 2023-04-24 PROCEDURE — 99214 OFFICE O/P EST MOD 30 MIN: CPT | Performed by: INTERNAL MEDICINE

## 2023-04-24 ASSESSMENT — FIBROSIS 4 INDEX: FIB4 SCORE: 0.84

## 2023-04-24 NOTE — PATIENT INSTRUCTIONS
Decrease metoprolol to 12.5mg instead of 25mg due to tiresome.  Sleep apnea testing  Heart Echo results  Send me your BP log on [employer]
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
## 2023-04-26 ENCOUNTER — TELEPHONE (OUTPATIENT)
Dept: CARDIOLOGY | Facility: MEDICAL CENTER | Age: 43
End: 2023-04-26
Payer: COMMERCIAL

## 2023-04-26 NOTE — TELEPHONE ENCOUNTER
Echo CD and report received from [facility]. Report Scanned into [employer]. Copy given to AL. CD sent to film room.

## 2023-04-28 ENCOUNTER — HOSPITAL ENCOUNTER (OUTPATIENT)
Dept: RADIOLOGY | Facility: MEDICAL CENTER | Age: 43
End: 2023-04-28
Payer: COMMERCIAL

## 2023-05-23 ENCOUNTER — TELEPHONE (OUTPATIENT)
Dept: CARDIOLOGY | Facility: MEDICAL CENTER | Age: 43
End: 2023-05-23
Payer: COMMERCIAL

## 2023-05-23 NOTE — TELEPHONE ENCOUNTER
Returned call, confirmed receipt of CD. Per [facility], sleep study was complete and report will be sent to AL next week to have prior to upcoming [facility] 6/6/23.

## 2023-05-23 NOTE — TELEPHONE ENCOUNTER
AL    Caller: [facility]-     Topic/issue: Checking to see if Dr. Vazquez received the echo on disc that was ordered from [facility] on 04/28/2023. Please call to confirm    Callback Number: [facility]   PH: [phone]    Thank you    Mandy GODINEZ

## 2023-06-06 ENCOUNTER — OFFICE VISIT (OUTPATIENT)
Dept: CARDIOLOGY | Facility: MEDICAL CENTER | Age: 43
End: 2023-06-06
Attending: INTERNAL MEDICINE
Payer: COMMERCIAL

## 2023-06-06 VITALS
WEIGHT: 315 LBS | HEART RATE: 72 BPM | RESPIRATION RATE: 16 BRPM | OXYGEN SATURATION: 95 % | BODY MASS INDEX: 39.17 KG/M2 | DIASTOLIC BLOOD PRESSURE: 88 MMHG | HEIGHT: 75 IN | SYSTOLIC BLOOD PRESSURE: 124 MMHG

## 2023-06-06 DIAGNOSIS — I49.3 PVCS (PREMATURE VENTRICULAR CONTRACTIONS): ICD-10-CM

## 2023-06-06 DIAGNOSIS — G47.33 OSA (OBSTRUCTIVE SLEEP APNEA): ICD-10-CM

## 2023-06-06 DIAGNOSIS — I49.1 PREMATURE ATRIAL CONTRACTIONS: ICD-10-CM

## 2023-06-06 DIAGNOSIS — E78.2 MIXED HYPERLIPIDEMIA: ICD-10-CM

## 2023-06-06 DIAGNOSIS — I10 ESSENTIAL HYPERTENSION, BENIGN: ICD-10-CM

## 2023-06-06 DIAGNOSIS — R00.2 HEART PALPITATIONS: Primary | ICD-10-CM

## 2023-06-06 DIAGNOSIS — Z71.89 COUNSELING ON HEALTH PROMOTION AND DISEASE PREVENTION: ICD-10-CM

## 2023-06-06 DIAGNOSIS — E66.9 OBESITY (BMI 30-39.9): ICD-10-CM

## 2023-06-06 PROCEDURE — 99214 OFFICE O/P EST MOD 30 MIN: CPT | Performed by: INTERNAL MEDICINE

## 2023-06-06 PROCEDURE — 3074F SYST BP LT 130 MM HG: CPT | Performed by: INTERNAL MEDICINE

## 2023-06-06 PROCEDURE — 3079F DIAST BP 80-89 MM HG: CPT | Performed by: INTERNAL MEDICINE

## 2023-06-06 PROCEDURE — 99212 OFFICE O/P EST SF 10 MIN: CPT | Performed by: INTERNAL MEDICINE

## 2023-06-06 RX ORDER — ECHINACEA PURPUREA EXTRACT 125 MG
1 TABLET ORAL
COMMUNITY
Start: 2022-10-11 | End: 2023-06-06

## 2023-06-06 ASSESSMENT — FIBROSIS 4 INDEX: FIB4 SCORE: 0.86
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
## 2023-06-14 ENCOUNTER — HOSPITAL ENCOUNTER (EMERGENCY)
Facility: MEDICAL CENTER | Age: 43
End: 2023-06-15
Attending: EMERGENCY MEDICINE
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
## 2023-06-14 DIAGNOSIS — R42 LIGHTHEADEDNESS: ICD-10-CM

## 2023-06-14 DIAGNOSIS — R07.9 CHEST PAIN, UNSPECIFIED TYPE: Primary | ICD-10-CM

## 2023-06-14 PROCEDURE — 80053 COMPREHEN METABOLIC PANEL: CPT

## 2023-06-14 PROCEDURE — 36415 COLL VENOUS BLD VENIPUNCTURE: CPT

## 2023-06-14 PROCEDURE — 84484 ASSAY OF TROPONIN QUANT: CPT

## 2023-06-14 PROCEDURE — 99285 EMERGENCY DEPT VISIT HI MDM: CPT

## 2023-06-14 PROCEDURE — 96374 THER/PROPH/DIAG INJ IV PUSH: CPT

## 2023-06-14 PROCEDURE — 85025 COMPLETE CBC W/AUTO DIFF WBC: CPT

## 2023-06-14 ASSESSMENT — FIBROSIS 4 INDEX: FIB4 SCORE: 0.86

## 2023-06-15 ENCOUNTER — APPOINTMENT (OUTPATIENT)
Dept: RADIOLOGY | Facility: MEDICAL CENTER | Age: 43
End: 2023-06-15
Attending: EMERGENCY MEDICINE

## 2023-06-15 VITALS
SYSTOLIC BLOOD PRESSURE: 144 MMHG | DIASTOLIC BLOOD PRESSURE: 90 MMHG | WEIGHT: 315 LBS | TEMPERATURE: 98 F | RESPIRATION RATE: 16 BRPM | HEART RATE: 90 BPM | HEIGHT: 75 IN | BODY MASS INDEX: 39.17 KG/M2 | OXYGEN SATURATION: 92 %

## 2023-06-15 LAB
ALBUMIN SERPL BCP-MCNC: 4.5 G/DL (ref 3.2–4.9)
ALBUMIN/GLOB SERPL: 1.6 G/DL
ALP SERPL-CCNC: 107 U/L (ref 30–99)
ALT SERPL-CCNC: 30 U/L (ref 2–50)
ANION GAP SERPL CALC-SCNC: 14 MMOL/L (ref 7–16)
AST SERPL-CCNC: 29 U/L (ref 12–45)
BASOPHILS # BLD AUTO: 0.3 % (ref 0–1.8)
BASOPHILS # BLD: 0.02 K/UL (ref 0–0.12)
BILIRUB SERPL-MCNC: 0.7 MG/DL (ref 0.1–1.5)
BUN SERPL-MCNC: 13 MG/DL (ref 8–22)
CALCIUM ALBUM COR SERPL-MCNC: 8.9 MG/DL (ref 8.5–10.5)
CALCIUM SERPL-MCNC: 9.3 MG/DL (ref 8.5–10.5)
CHLORIDE SERPL-SCNC: 109 MMOL/L (ref 96–112)
CO2 SERPL-SCNC: 19 MMOL/L (ref 20–33)
CREAT SERPL-MCNC: 0.74 MG/DL (ref 0.5–1.4)
EOSINOPHIL # BLD AUTO: 0.04 K/UL (ref 0–0.51)
EOSINOPHIL NFR BLD: 0.6 % (ref 0–6.9)
ERYTHROCYTE [DISTWIDTH] IN BLOOD BY AUTOMATED COUNT: 40 FL (ref 35.9–50)
GFR SERPLBLD CREATININE-BSD FMLA CKD-EPI: 116 ML/MIN/1.73 M 2
GLOBULIN SER CALC-MCNC: 2.8 G/DL (ref 1.9–3.5)
GLUCOSE SERPL-MCNC: 93 MG/DL (ref 65–99)
HCT VFR BLD AUTO: 42.6 % (ref 42–52)
HGB BLD-MCNC: 15 G/DL (ref 14–18)
IMM GRANULOCYTES # BLD AUTO: 0.02 K/UL (ref 0–0.11)
IMM GRANULOCYTES NFR BLD AUTO: 0.3 % (ref 0–0.9)
LYMPHOCYTES # BLD AUTO: 2.19 K/UL (ref 1–4.8)
LYMPHOCYTES NFR BLD: 32.3 % (ref 22–41)
MCH RBC QN AUTO: 30.9 PG (ref 27–33)
MCHC RBC AUTO-ENTMCNC: 35.2 G/DL (ref 32.3–36.5)
MCV RBC AUTO: 87.7 FL (ref 81.4–97.8)
MONOCYTES # BLD AUTO: 0.44 K/UL (ref 0–0.85)
MONOCYTES NFR BLD AUTO: 6.5 % (ref 0–13.4)
NEUTROPHILS # BLD AUTO: 4.07 K/UL (ref 1.82–7.42)
NEUTROPHILS NFR BLD: 60 % (ref 44–72)
NRBC # BLD AUTO: 0 K/UL
NRBC BLD-RTO: 0 /100 WBC (ref 0–0.2)
PLATELET # BLD AUTO: 157 K/UL (ref 164–446)
PMV BLD AUTO: 10 FL (ref 9–12.9)
POTASSIUM SERPL-SCNC: 3.6 MMOL/L (ref 3.6–5.5)
PROT SERPL-MCNC: 7.3 G/DL (ref 6–8.2)
RBC # BLD AUTO: 4.86 M/UL (ref 4.7–6.1)
SODIUM SERPL-SCNC: 142 MMOL/L (ref 135–145)
TROPONIN T SERPL-MCNC: <6 NG/L (ref 6–19)
WBC # BLD AUTO: 6.8 K/UL (ref 4.8–10.8)

## 2023-06-15 PROCEDURE — 71046 X-RAY EXAM CHEST 2 VIEWS: CPT

## 2023-06-15 PROCEDURE — 700105 HCHG RX REV CODE 258: Performed by: EMERGENCY MEDICINE

## 2023-06-15 PROCEDURE — 700111 HCHG RX REV CODE 636 W/ 250 OVERRIDE (IP): Performed by: EMERGENCY MEDICINE

## 2023-06-15 RX ORDER — ONDANSETRON 2 MG/ML
4 INJECTION INTRAMUSCULAR; INTRAVENOUS ONCE
Status: COMPLETED | OUTPATIENT
Start: 2023-06-15 | End: 2023-06-15

## 2023-06-15 RX ORDER — SODIUM CHLORIDE 9 MG/ML
1000 INJECTION, SOLUTION INTRAVENOUS ONCE
Status: COMPLETED | OUTPATIENT
Start: 2023-06-15 | End: 2023-06-15

## 2023-06-15 RX ADMIN — SODIUM CHLORIDE 1000 ML: 9 INJECTION, SOLUTION INTRAVENOUS at 00:40

## 2023-06-15 RX ADMIN — ONDANSETRON 4 MG: 2 INJECTION INTRAMUSCULAR; INTRAVENOUS at 00:39

## 2023-06-15 NOTE — ED NOTES
Patient discharged per orders. IV removed -bandage applied.  Pt verbalized understanding of discharge instructions. Pt leaving in stable condition with all belongings.
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
## 2023-06-15 NOTE — ED NOTES
Assumed care of patient, bedside report received from JS Lomeli.  Introduced self as pt RN, POC discussed, call light in reach.

## 2023-06-15 NOTE — ED NOTES
This RN agree with triage. Pt wheelchair to [place] [address]. Wife at bedside. IV estbalished, blood drawn and sent. Pt connected to vitals machine.
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
## 2023-08-08 ENCOUNTER — TELEPHONE (OUTPATIENT)
Dept: CARDIOLOGY | Facility: MEDICAL CENTER | Age: 43
End: 2023-08-08
Payer: COMMERCIAL

## 2023-10-16 ENCOUNTER — TELEPHONE (OUTPATIENT)
Dept: CARDIOLOGY | Facility: MEDICAL CENTER | Age: 43
End: 2023-10-16
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
